# Patient Record
Sex: FEMALE | Race: BLACK OR AFRICAN AMERICAN | NOT HISPANIC OR LATINO | Employment: FULL TIME | URBAN - METROPOLITAN AREA
[De-identification: names, ages, dates, MRNs, and addresses within clinical notes are randomized per-mention and may not be internally consistent; named-entity substitution may affect disease eponyms.]

---

## 2019-08-12 ENCOUNTER — TRANSCRIBE ORDERS (OUTPATIENT)
Dept: ADMINISTRATIVE | Facility: HOSPITAL | Age: 34
End: 2019-08-12

## 2019-08-12 ENCOUNTER — HOSPITAL ENCOUNTER (OUTPATIENT)
Dept: RADIOLOGY | Facility: HOSPITAL | Age: 34
Discharge: HOME | End: 2019-08-12
Attending: INTERNAL MEDICINE
Payer: COMMERCIAL

## 2019-08-12 ENCOUNTER — APPOINTMENT (OUTPATIENT)
Dept: LAB | Facility: HOSPITAL | Age: 34
End: 2019-08-12
Attending: INTERNAL MEDICINE
Payer: COMMERCIAL

## 2019-08-12 DIAGNOSIS — M25.50 PAIN IN JOINT, MULTIPLE SITES: Primary | ICD-10-CM

## 2019-08-12 DIAGNOSIS — M25.50 PAIN IN JOINT, MULTIPLE SITES: ICD-10-CM

## 2019-08-12 LAB
BASOPHILS # BLD AUTO: 0.05 THOUSANDS/ΜL (ref 0–0.1)
BASOPHILS NFR BLD AUTO: 1 % (ref 0–1)
EOSINOPHIL # BLD AUTO: 0.1 THOUSAND/ΜL (ref 0–0.61)
EOSINOPHIL NFR BLD AUTO: 2 % (ref 0–6)
ERYTHROCYTE [DISTWIDTH] IN BLOOD BY AUTOMATED COUNT: 12.2 % (ref 11.6–15.1)
ERYTHROCYTE [SEDIMENTATION RATE] IN BLOOD: 4 MM/HOUR (ref 0–20)
HCT VFR BLD AUTO: 41.5 % (ref 34.8–46.1)
HGB BLD-MCNC: 13.6 G/DL (ref 11.5–15.4)
IMM GRANULOCYTES # BLD AUTO: 0.01 THOUSAND/UL (ref 0–0.2)
IMM GRANULOCYTES NFR BLD AUTO: 0 % (ref 0–2)
LYMPHOCYTES # BLD AUTO: 2.13 THOUSANDS/ΜL (ref 0.6–4.47)
LYMPHOCYTES NFR BLD AUTO: 37 % (ref 14–44)
MCH RBC QN AUTO: 29.8 PG (ref 26.8–34.3)
MCHC RBC AUTO-ENTMCNC: 32.8 G/DL (ref 31.4–37.4)
MCV RBC AUTO: 91 FL (ref 82–98)
MONOCYTES # BLD AUTO: 0.33 THOUSAND/ΜL (ref 0.17–1.22)
MONOCYTES NFR BLD AUTO: 6 % (ref 4–12)
NEUTROPHILS # BLD AUTO: 3.09 THOUSANDS/ΜL (ref 1.85–7.62)
NEUTS SEG NFR BLD AUTO: 54 % (ref 43–75)
NRBC BLD AUTO-RTO: 0 /100 WBCS
PLATELET # BLD AUTO: 283 THOUSANDS/UL (ref 149–390)
PMV BLD AUTO: 10.8 FL (ref 8.9–12.7)
RBC # BLD AUTO: 4.57 MILLION/UL (ref 3.81–5.12)
WBC # BLD AUTO: 5.71 THOUSAND/UL (ref 4.31–10.16)

## 2019-08-12 PROCEDURE — 73630 X-RAY EXAM OF FOOT: CPT

## 2019-08-12 PROCEDURE — 86200 CCP ANTIBODY: CPT

## 2019-08-12 PROCEDURE — 86618 LYME DISEASE ANTIBODY: CPT

## 2019-08-12 PROCEDURE — 36415 COLL VENOUS BLD VENIPUNCTURE: CPT

## 2019-08-12 PROCEDURE — 86430 RHEUMATOID FACTOR TEST QUAL: CPT

## 2019-08-12 PROCEDURE — 73110 X-RAY EXAM OF WRIST: CPT

## 2019-08-12 PROCEDURE — 86225 DNA ANTIBODY NATIVE: CPT

## 2019-08-12 PROCEDURE — 85025 COMPLETE CBC W/AUTO DIFF WBC: CPT

## 2019-08-12 PROCEDURE — 73522 X-RAY EXAM HIPS BI 3-4 VIEWS: CPT

## 2019-08-12 PROCEDURE — 86038 ANTINUCLEAR ANTIBODIES: CPT

## 2019-08-12 PROCEDURE — 85652 RBC SED RATE AUTOMATED: CPT

## 2019-08-13 LAB
DSDNA AB SER-ACNC: <1 IU/ML (ref 0–9)
RHEUMATOID FACT SER QL LA: NEGATIVE

## 2019-08-14 LAB
B BURGDOR IGG SER IA-ACNC: 0.08
B BURGDOR IGM SER IA-ACNC: 0.34
CCP IGA+IGG SERPL IA-ACNC: 15 UNITS (ref 0–19)
RYE IGE QN: NEGATIVE

## 2019-08-16 ENCOUNTER — TRANSCRIBE ORDERS (OUTPATIENT)
Dept: ADMINISTRATIVE | Facility: HOSPITAL | Age: 34
End: 2019-08-16

## 2019-08-16 ENCOUNTER — HOSPITAL ENCOUNTER (OUTPATIENT)
Dept: RADIOLOGY | Facility: HOSPITAL | Age: 34
Discharge: HOME | End: 2019-08-16
Payer: OTHER MISCELLANEOUS

## 2019-08-16 DIAGNOSIS — S99.921A INJURY OF TOE ON RIGHT FOOT, INITIAL ENCOUNTER: ICD-10-CM

## 2019-08-16 DIAGNOSIS — M79.671 RIGHT FOOT PAIN: ICD-10-CM

## 2019-08-16 DIAGNOSIS — M79.671 RIGHT FOOT PAIN: Primary | ICD-10-CM

## 2019-08-16 PROCEDURE — 73590 X-RAY EXAM OF LOWER LEG: CPT

## 2019-08-16 PROCEDURE — 73610 X-RAY EXAM OF ANKLE: CPT

## 2019-08-16 PROCEDURE — 73630 X-RAY EXAM OF FOOT: CPT

## 2019-08-27 ENCOUNTER — TRANSCRIBE ORDERS (OUTPATIENT)
Dept: ADMINISTRATIVE | Facility: HOSPITAL | Age: 34
End: 2019-08-27

## 2019-08-27 ENCOUNTER — HOSPITAL ENCOUNTER (OUTPATIENT)
Dept: ULTRASOUND IMAGING | Facility: HOSPITAL | Age: 34
Discharge: HOME | End: 2019-08-27
Payer: COMMERCIAL

## 2019-08-27 DIAGNOSIS — M79.661 TENDERNESS OF RIGHT CALF: ICD-10-CM

## 2019-08-27 DIAGNOSIS — M79.661 TENDERNESS OF RIGHT CALF: Primary | ICD-10-CM

## 2019-08-27 PROCEDURE — 93971 EXTREMITY STUDY: CPT

## 2019-08-27 PROCEDURE — 93971 EXTREMITY STUDY: CPT | Performed by: SURGERY

## 2019-09-12 ENCOUNTER — TRANSCRIBE ORDERS (OUTPATIENT)
Dept: ADMINISTRATIVE | Facility: HOSPITAL | Age: 34
End: 2019-09-12

## 2019-09-12 DIAGNOSIS — Z00.00 ROUTINE GENERAL MEDICAL EXAMINATION AT A HEALTH CARE FACILITY: ICD-10-CM

## 2019-09-12 DIAGNOSIS — R42 DIZZINESS AND GIDDINESS: ICD-10-CM

## 2019-09-12 DIAGNOSIS — E55.9 VITAMIN D DEFICIENCY: ICD-10-CM

## 2019-09-12 DIAGNOSIS — R16.1 SPLENOMEGALY: Primary | ICD-10-CM

## 2019-09-16 ENCOUNTER — HOSPITAL ENCOUNTER (OUTPATIENT)
Dept: ULTRASOUND IMAGING | Facility: HOSPITAL | Age: 34
Discharge: HOME | End: 2019-09-16
Attending: INTERNAL MEDICINE
Payer: COMMERCIAL

## 2019-09-16 ENCOUNTER — HOSPITAL ENCOUNTER (OUTPATIENT)
Dept: NON INVASIVE DIAGNOSTICS | Facility: HOSPITAL | Age: 34
Discharge: HOME | End: 2019-09-16
Payer: COMMERCIAL

## 2019-09-16 ENCOUNTER — APPOINTMENT (OUTPATIENT)
Dept: LAB | Facility: HOSPITAL | Age: 34
End: 2019-09-16
Payer: COMMERCIAL

## 2019-09-16 DIAGNOSIS — R42 DIZZINESS AND GIDDINESS: ICD-10-CM

## 2019-09-16 DIAGNOSIS — Z00.00 ROUTINE GENERAL MEDICAL EXAMINATION AT A HEALTH CARE FACILITY: ICD-10-CM

## 2019-09-16 DIAGNOSIS — R16.1 SPLENOMEGALY: ICD-10-CM

## 2019-09-16 DIAGNOSIS — E55.9 VITAMIN D DEFICIENCY: ICD-10-CM

## 2019-09-16 LAB
25(OH)D3 SERPL-MCNC: 21.6 NG/ML (ref 30–100)
ATRIAL RATE: 77 BPM
BASOPHILS # BLD AUTO: 0.04 THOUSANDS/ΜL (ref 0–0.1)
BASOPHILS NFR BLD AUTO: 1 % (ref 0–1)
CHOLEST SERPL-MCNC: 166 MG/DL (ref 50–200)
EOSINOPHIL # BLD AUTO: 0.11 THOUSAND/ΜL (ref 0–0.61)
EOSINOPHIL NFR BLD AUTO: 2 % (ref 0–6)
ERYTHROCYTE [DISTWIDTH] IN BLOOD BY AUTOMATED COUNT: 12 % (ref 11.6–15.1)
EST. AVERAGE GLUCOSE BLD GHB EST-MCNC: 94 MG/DL
HBA1C MFR BLD: 4.9 % (ref 4.2–6.3)
HCT VFR BLD AUTO: 40.4 % (ref 34.8–46.1)
HDLC SERPL-MCNC: 47 MG/DL (ref 40–60)
HGB BLD-MCNC: 12.9 G/DL (ref 11.5–15.4)
IMM GRANULOCYTES # BLD AUTO: 0.01 THOUSAND/UL (ref 0–0.2)
IMM GRANULOCYTES NFR BLD AUTO: 0 % (ref 0–2)
LDLC SERPL CALC-MCNC: 110 MG/DL (ref 0–100)
LYMPHOCYTES # BLD AUTO: 2.18 THOUSANDS/ΜL (ref 0.6–4.47)
LYMPHOCYTES NFR BLD AUTO: 36 % (ref 14–44)
MCH RBC QN AUTO: 29.5 PG (ref 26.8–34.3)
MCHC RBC AUTO-ENTMCNC: 31.9 G/DL (ref 31.4–37.4)
MCV RBC AUTO: 92 FL (ref 82–98)
MONOCYTES # BLD AUTO: 0.32 THOUSAND/ΜL (ref 0.17–1.22)
MONOCYTES NFR BLD AUTO: 5 % (ref 4–12)
NEUTROPHILS # BLD AUTO: 3.48 THOUSANDS/ΜL (ref 1.85–7.62)
NEUTS SEG NFR BLD AUTO: 56 % (ref 43–75)
NONHDLC SERPL-MCNC: 119 MG/DL
NRBC BLD AUTO-RTO: 0 /100 WBCS
P AXIS: 74 DEGREES
PLATELET # BLD AUTO: 285 THOUSANDS/UL (ref 149–390)
PMV BLD AUTO: 10.8 FL (ref 8.9–12.7)
PR INTERVAL: 146 MS
QRS AXIS: 65 DEGREES
QRSD INTERVAL: 72 MS
QT INTERVAL: 358 MS
QTC INTERVAL: 405 MS
RBC # BLD AUTO: 4.37 MILLION/UL (ref 3.81–5.12)
T WAVE AXIS: 47 DEGREES
TRIGL SERPL-MCNC: 44 MG/DL
VENTRICULAR RATE: 77 BPM
WBC # BLD AUTO: 6.14 THOUSAND/UL (ref 4.31–10.16)

## 2019-09-16 PROCEDURE — 93010 ELECTROCARDIOGRAM REPORT: CPT | Performed by: INTERNAL MEDICINE

## 2019-09-16 PROCEDURE — 93005 ELECTROCARDIOGRAM TRACING: CPT

## 2019-09-16 PROCEDURE — 85025 COMPLETE CBC W/AUTO DIFF WBC: CPT

## 2019-09-16 PROCEDURE — 80061 LIPID PANEL: CPT

## 2019-09-16 PROCEDURE — 36415 COLL VENOUS BLD VENIPUNCTURE: CPT

## 2019-09-16 PROCEDURE — 76700 US EXAM ABDOM COMPLETE: CPT

## 2019-09-16 PROCEDURE — 83036 HEMOGLOBIN GLYCOSYLATED A1C: CPT

## 2019-09-16 PROCEDURE — 82306 VITAMIN D 25 HYDROXY: CPT

## 2021-02-11 ENCOUNTER — TRANSCRIBE ORDERS (OUTPATIENT)
Dept: ADMINISTRATIVE | Facility: HOSPITAL | Age: 36
End: 2021-02-11

## 2021-02-11 DIAGNOSIS — R09.89 OTHER SPECIFIED SYMPTOMS AND SIGNS INVOLVING THE CIRCULATORY AND RESPIRATORY SYSTEMS: Primary | ICD-10-CM

## 2021-02-13 ENCOUNTER — LAB (OUTPATIENT)
Dept: LAB | Facility: HOSPITAL | Age: 36
End: 2021-02-13
Attending: INTERNAL MEDICINE
Payer: COMMERCIAL

## 2021-02-13 ENCOUNTER — TRANSCRIBE ORDERS (OUTPATIENT)
Dept: ADMINISTRATIVE | Facility: HOSPITAL | Age: 36
End: 2021-02-13

## 2021-02-13 DIAGNOSIS — Z11.59 SCREENING EXAMINATION FOR POLIOMYELITIS: ICD-10-CM

## 2021-02-13 DIAGNOSIS — E55.9 VITAMIN D DEFICIENCY: ICD-10-CM

## 2021-02-13 DIAGNOSIS — R00.2 PALPITATIONS: ICD-10-CM

## 2021-02-13 DIAGNOSIS — E03.9 ACQUIRED HYPOTHYROIDISM: ICD-10-CM

## 2021-02-13 DIAGNOSIS — D64.9 ANEMIA, UNSPECIFIED TYPE: ICD-10-CM

## 2021-02-13 DIAGNOSIS — Z00.00 ROUTINE GENERAL MEDICAL EXAMINATION AT A HEALTH CARE FACILITY: ICD-10-CM

## 2021-02-13 DIAGNOSIS — E78.2 MIXED HYPERLIPIDEMIA: ICD-10-CM

## 2021-02-13 DIAGNOSIS — R09.89 ABNORMAL CHEST SOUNDS: ICD-10-CM

## 2021-02-13 DIAGNOSIS — R10.9 STOMACH ACHE: Primary | ICD-10-CM

## 2021-02-13 LAB
25(OH)D3 SERPL-MCNC: 29 NG/ML (ref 30–100)
ABO GROUP BLD: NORMAL
ALBUMIN SERPL BCP-MCNC: 3.8 G/DL (ref 3.5–5)
ALP SERPL-CCNC: 69 U/L (ref 46–116)
ALT SERPL W P-5'-P-CCNC: 21 U/L (ref 12–78)
ANION GAP SERPL CALCULATED.3IONS-SCNC: 8 MMOL/L (ref 4–13)
AST SERPL W P-5'-P-CCNC: 19 U/L (ref 5–45)
BASOPHILS # BLD AUTO: 0.04 THOUSANDS/ΜL (ref 0–0.1)
BASOPHILS NFR BLD AUTO: 1 % (ref 0–1)
BILIRUB SERPL-MCNC: 0.5 MG/DL (ref 0.2–1)
BLD GP AB SCN SERPL QL: NEGATIVE
BUN SERPL-MCNC: 16 MG/DL (ref 5–25)
CALCIUM SERPL-MCNC: 9.3 MG/DL (ref 8.3–10.1)
CHLORIDE SERPL-SCNC: 106 MMOL/L (ref 100–108)
CHOLEST SERPL-MCNC: 169 MG/DL (ref 50–200)
CO2 SERPL-SCNC: 28 MMOL/L (ref 21–32)
CREAT SERPL-MCNC: 0.92 MG/DL (ref 0.6–1.3)
EOSINOPHIL # BLD AUTO: 0.06 THOUSAND/ΜL (ref 0–0.61)
EOSINOPHIL NFR BLD AUTO: 1 % (ref 0–6)
ERYTHROCYTE [DISTWIDTH] IN BLOOD BY AUTOMATED COUNT: 12.5 % (ref 11.6–15.1)
EST. AVERAGE GLUCOSE BLD GHB EST-MCNC: 94 MG/DL
GFR SERPL CREATININE-BSD FRML MDRD: 93 ML/MIN/1.73SQ M
GLUCOSE P FAST SERPL-MCNC: 91 MG/DL (ref 65–99)
HBA1C MFR BLD: 4.9 %
HCT VFR BLD AUTO: 40.8 % (ref 34.8–46.1)
HDLC SERPL-MCNC: 64 MG/DL
HGB BLD-MCNC: 12.9 G/DL (ref 11.5–15.4)
IMM GRANULOCYTES # BLD AUTO: 0.01 THOUSAND/UL (ref 0–0.2)
IMM GRANULOCYTES NFR BLD AUTO: 0 % (ref 0–2)
LDLC SERPL CALC-MCNC: 98 MG/DL (ref 0–100)
LYMPHOCYTES # BLD AUTO: 2.26 THOUSANDS/ΜL (ref 0.6–4.47)
LYMPHOCYTES NFR BLD AUTO: 51 % (ref 14–44)
MCH RBC QN AUTO: 29.1 PG (ref 26.8–34.3)
MCHC RBC AUTO-ENTMCNC: 31.6 G/DL (ref 31.4–37.4)
MCV RBC AUTO: 92 FL (ref 82–98)
MONOCYTES # BLD AUTO: 0.29 THOUSAND/ΜL (ref 0.17–1.22)
MONOCYTES NFR BLD AUTO: 6 % (ref 4–12)
NEUTROPHILS # BLD AUTO: 1.86 THOUSANDS/ΜL (ref 1.85–7.62)
NEUTS SEG NFR BLD AUTO: 41 % (ref 43–75)
NONHDLC SERPL-MCNC: 105 MG/DL
NRBC BLD AUTO-RTO: 0 /100 WBCS
PLATELET # BLD AUTO: 259 THOUSANDS/UL (ref 149–390)
PMV BLD AUTO: 10.4 FL (ref 8.9–12.7)
POTASSIUM SERPL-SCNC: 4.9 MMOL/L (ref 3.5–5.3)
PROT SERPL-MCNC: 7.8 G/DL (ref 6.4–8.2)
RBC # BLD AUTO: 4.44 MILLION/UL (ref 3.81–5.12)
RH BLD: POSITIVE
SODIUM SERPL-SCNC: 142 MMOL/L (ref 136–145)
SPECIMEN EXPIRATION DATE: NORMAL
TRIGL SERPL-MCNC: 34 MG/DL
TSH SERPL DL<=0.05 MIU/L-ACNC: 0.61 UIU/ML (ref 0.36–3.74)
WBC # BLD AUTO: 4.52 THOUSAND/UL (ref 4.31–10.16)

## 2021-02-13 PROCEDURE — 85025 COMPLETE CBC W/AUTO DIFF WBC: CPT

## 2021-02-13 PROCEDURE — 83036 HEMOGLOBIN GLYCOSYLATED A1C: CPT

## 2021-02-13 PROCEDURE — 36415 COLL VENOUS BLD VENIPUNCTURE: CPT

## 2021-02-13 PROCEDURE — 86850 RBC ANTIBODY SCREEN: CPT

## 2021-02-13 PROCEDURE — 86803 HEPATITIS C AB TEST: CPT

## 2021-02-13 PROCEDURE — 80061 LIPID PANEL: CPT

## 2021-02-13 PROCEDURE — 86901 BLOOD TYPING SEROLOGIC RH(D): CPT

## 2021-02-13 PROCEDURE — 84443 ASSAY THYROID STIM HORMONE: CPT

## 2021-02-13 PROCEDURE — 86900 BLOOD TYPING SEROLOGIC ABO: CPT

## 2021-02-13 PROCEDURE — 80053 COMPREHEN METABOLIC PANEL: CPT

## 2021-02-13 PROCEDURE — 82306 VITAMIN D 25 HYDROXY: CPT

## 2021-02-14 LAB — HCV AB SER QL: NORMAL

## 2021-02-21 ENCOUNTER — HOSPITAL ENCOUNTER (OUTPATIENT)
Dept: ULTRASOUND IMAGING | Facility: HOSPITAL | Age: 36
Discharge: HOME | End: 2021-02-21
Attending: INTERNAL MEDICINE

## 2021-02-21 DIAGNOSIS — R09.89 OTHER SPECIFIED SYMPTOMS AND SIGNS INVOLVING THE CIRCULATORY AND RESPIRATORY SYSTEMS: ICD-10-CM

## 2021-03-17 ENCOUNTER — HOSPITAL ENCOUNTER (OUTPATIENT)
Dept: NON INVASIVE DIAGNOSTICS | Facility: HOSPITAL | Age: 36
Discharge: HOME | End: 2021-03-17
Attending: INTERNAL MEDICINE
Payer: COMMERCIAL

## 2021-03-17 ENCOUNTER — HOSPITAL ENCOUNTER (OUTPATIENT)
Dept: VASCULAR ULTRASOUND | Facility: HOSPITAL | Age: 36
Discharge: HOME | End: 2021-03-17
Payer: COMMERCIAL

## 2021-03-17 ENCOUNTER — HOSPITAL ENCOUNTER (OUTPATIENT)
Dept: ULTRASOUND IMAGING | Facility: HOSPITAL | Age: 36
Discharge: HOME | End: 2021-03-17
Attending: INTERNAL MEDICINE
Payer: COMMERCIAL

## 2021-03-17 DIAGNOSIS — R00.2 PALPITATIONS: ICD-10-CM

## 2021-03-17 DIAGNOSIS — R09.89 ABNORMAL CHEST SOUNDS: ICD-10-CM

## 2021-03-17 DIAGNOSIS — R10.9 STOMACH ACHE: ICD-10-CM

## 2021-03-17 LAB
ARRHY DURING EX: NORMAL
CHEST PAIN STATEMENT: NORMAL
MAX DIASTOLIC BP: 70 MMHG
MAX HEART RATE: 169 BPM
MAX PREDICTED HEART RATE: 184 BPM
MAX. SYSTOLIC BP: 148 MMHG
PROTOCOL NAME: NORMAL
REASON FOR TERMINATION: NORMAL
TARGET HR FORMULA: NORMAL
TEST INDICATION: NORMAL
TIME IN EXERCISE PHASE: NORMAL

## 2021-03-17 PROCEDURE — 93978 VASCULAR STUDY: CPT | Performed by: SURGERY

## 2021-03-17 PROCEDURE — 93018 CV STRESS TEST I&R ONLY: CPT | Performed by: INTERNAL MEDICINE

## 2021-03-17 PROCEDURE — 76700 US EXAM ABDOM COMPLETE: CPT

## 2021-03-17 PROCEDURE — 93978 VASCULAR STUDY: CPT

## 2021-03-17 PROCEDURE — 93016 CV STRESS TEST SUPVJ ONLY: CPT | Performed by: INTERNAL MEDICINE

## 2021-03-17 PROCEDURE — 93017 CV STRESS TEST TRACING ONLY: CPT

## 2021-05-24 ENCOUNTER — TELEPHONE (OUTPATIENT)
Dept: GASTROENTEROLOGY | Facility: AMBULARY SURGERY CENTER | Age: 36
End: 2021-05-24

## 2021-05-24 NOTE — TELEPHONE ENCOUNTER
Reschedule letter mailed to patient making them aware of new office appointment  Per office protocol, patient may be asked to see one of our Advanced Practitioners for next visit  Scheduling number was provided on the reschedule letter should patient have any concerns

## 2021-06-14 ENCOUNTER — EVALUATION (OUTPATIENT)
Dept: PHYSICAL THERAPY | Facility: CLINIC | Age: 36
End: 2021-06-14
Payer: COMMERCIAL

## 2021-06-14 DIAGNOSIS — M54.12 CERVICAL RADICULOPATHY: Primary | ICD-10-CM

## 2021-06-14 DIAGNOSIS — M79.602 LEFT ARM PAIN: ICD-10-CM

## 2021-06-14 PROCEDURE — 97162 PT EVAL MOD COMPLEX 30 MIN: CPT | Performed by: PHYSICAL THERAPIST

## 2021-06-14 PROCEDURE — 97112 NEUROMUSCULAR REEDUCATION: CPT | Performed by: PHYSICAL THERAPIST

## 2021-06-14 PROCEDURE — 97140 MANUAL THERAPY 1/> REGIONS: CPT | Performed by: PHYSICAL THERAPIST

## 2021-06-14 NOTE — PROGRESS NOTES
PT Evaluation     Today's date: 2021  Patient name: Julien Casiano  : 1985  MRN: [id number]  Referring provider: Suhas Domínguez MD  Dx: No diagnosis found  Assessment  Assessment details: Julien Casiano is a 38 y o  female who presents with pain, decreased strength, decreased ROM, decreased joint mobility and postural  dysfunction  Due to these impairments, Patient has difficulty performing a/iadls, recreational activities, work-related activities and engaging in social activities  Patient has signs and symptoms consistent with their referring diagnosis of cervical radiculopathy, left  Left arm pain  Patient would benefit from skilled physical therapy to address the impairments, improve their level of function and to improve their overall quality of life  Impairments: abnormal muscle tone, abnormal or restricted ROM, abnormal movement, activity intolerance, impaired physical strength, lacks appropriate home exercise program, poor posture  and poor body mechanics    Symptom irritability: moderateUnderstanding of Dx/Px/POC: excellent  Goals  Short Term Goals: to be achieved by 4 weeks  1) Patient to be independent with basic HEP  2) Decrease pain to 3/10 at its worst   3) Increase cervical spine ROM by 5-10 degrees in all deficient planes  4) Increase UE strength by 1/2 MMT grade in all deficient planes  5) Improve joint mobility in cervical spine to normal     Long Term Goals: to be achieved by discharge  1) FOTO equal to or greater than 75   2) Patient to be independent with comprehensive HEP  3) Cervical spine ROM WNL all planes to improve a/iadls  4) Increase UE strength to 1 MMT grade in all planes to improve a/iadls    5) Lifting is improved to maximal level of function       Plan  Patient would benefit from: PT eval and skilled physical therapy  Planned modality interventions: low level laser therapy, TENS, thermotherapy: hydrocollator packs, ultrasound and cryotherapy  Planned therapy interventions: manual therapy, activity modification, behavior modification, neuromuscular re-education, therapeutic activities, therapeutic exercise, gait training, graded activity, graded exercise, patient education, home exercise program and sensory integrative techniques  Frequency: 2x week  Duration in weeks: 6  Treatment plan discussed with: patient        Subjective Evaluation    History of Present Illness  Mechanism of injury: History of Current Injury: Patient believes that she has a pinched nerve which she has had for years, started to get worse about 1-2 months ago, she is having numbness in her thumb and index finger, shooting pain down the left arm arm  Pain location/Descriptors: neck pain is described as a pinch, like something is cramped  Aggravating factors: laying on the right side, lifting the left arm  She states that she will be doing her normal activities and out of no where she will have pain  Easing factors: warm bath, massage to the neck  24 HR pattern: NA  Imaging: NA  Special Questions: Patient reports numbness and tingling in the left arm but denies burning  Patient denies headaches  Patient denies night pain and night sweats  Patient denies sustained morning stiffness  Patient denies lightheadedness  Patient denies constipation  Patient denies easy bruising  Patient denies changes in vision  Patient goals:  Eliminate pain  Occupation:  at the [employer]               Objective     Passive Range of Motion   Cervical/Thoracic Spine   Normal passive range of motion    Joint Play     Hypermobile: C4, C5, C6, C7 and T1     Pain: C4, C5, C6, C7 and T1   Mechanical Assessment    Cervical    Seated Protrusion: repeated movements   Pain location: no change  Seated retraction: repeated movements   Pain intensity: better    Thoracic      Lumbar      Tests   Cervical   Positive repeated flexion, vertical compression and lumbar distraction test     Left Positive Spurling's Test A and Spurling's Test B  Left Shoulder   Positive ULTT2  Lumbar   Positive vertical compression                 Precautions: NA      Manuals 6/14                         Manual traction EG                                      Neuro Re-Ed                          Median nerve glide x30                                                                             Ther Ex                                                                                                                     Ther Activity                                       Gait Training                                       Modalities

## 2021-06-14 NOTE — LETTER
Anju 15, 2021    [facility]  [address] [facility] [place]  [address]    Patient: Quin Reed   YOB: 1985   Date of Visit: 2021     Encounter Diagnosis     ICD-10-CM    1  Cervical radiculopathy  M54 12    2  Left arm pain  M79 602        Dear Dr J Luis Vides:    Thank you for your recent referral of Kari Woods  Please review the attached evaluation summary from Kari's recent visit  Please verify that you agree with the plan of care by signing the attached order  If you have any questions or concerns, please do not hesitate to call  I sincerely appreciate the opportunity to share in the care of one of your patients and hope to have another opportunity to work with you in the near future  Sincerely,    Kamila Dawson, PT      Referring Provider:      I certify that I have read the below Plan of Care and certify the need for these services furnished under this plan of treatment while under my care  [facility]  [address] [place]  [address] [address]  Via Fax: [phone]          PT Evaluation     Today's date: 2021  Patient name: Quin Reed  : 1985  MRN: [id number]  Referring provider: Kin Moseley MD  Dx: No diagnosis found  Assessment  Assessment details: Quin Reed is a 38 y o  female who presents with pain, decreased strength, decreased ROM, decreased joint mobility and postural  dysfunction  Due to these impairments, Patient has difficulty performing a/iadls, recreational activities, work-related activities and engaging in social activities  Patient has signs and symptoms consistent with their referring diagnosis of cervical radiculopathy, left  Left arm pain  Patient would benefit from skilled physical therapy to address the impairments, improve their level of function and to improve their overall quality of life      Impairments: abnormal muscle tone, abnormal or restricted ROM, abnormal movement, activity intolerance, impaired physical strength, lacks appropriate home exercise program, poor posture  and poor body mechanics    Symptom irritability: moderateUnderstanding of Dx/Px/POC: excellent  Goals  Short Term Goals: to be achieved by 4 weeks  1) Patient to be independent with basic HEP  2) Decrease pain to 3/10 at its worst   3) Increase cervical spine ROM by 5-10 degrees in all deficient planes  4) Increase UE strength by 1/2 MMT grade in all deficient planes  5) Improve joint mobility in cervical spine to normal     Long Term Goals: to be achieved by discharge  1) FOTO equal to or greater than 75   2) Patient to be independent with comprehensive HEP  3) Cervical spine ROM WNL all planes to improve a/iadls  4) Increase UE strength to 1 MMT grade in all planes to improve a/iadls  5) Lifting is improved to maximal level of function       Plan  Patient would benefit from: PT eval and skilled physical therapy  Planned modality interventions: low level laser therapy, TENS, thermotherapy: hydrocollator packs, ultrasound and cryotherapy  Planned therapy interventions: manual therapy, activity modification, behavior modification, neuromuscular re-education, therapeutic activities, therapeutic exercise, gait training, graded activity, graded exercise, patient education, home exercise program and sensory integrative techniques  Frequency: 2x week  Duration in weeks: 6  Treatment plan discussed with: patient        Subjective Evaluation    History of Present Illness  Mechanism of injury: History of Current Injury: Patient believes that she has a pinched nerve which she has had for years, started to get worse about 1-2 months ago, she is having numbness in her thumb and index finger, shooting pain down the left arm arm  Pain location/Descriptors: neck pain is described as a pinch, like something is cramped  Aggravating factors: laying on the right side, lifting the left arm  She states that she will be doing her normal activities and out of no where she will have pain  Easing factors: warm bath, massage to the neck  24 HR pattern: NA  Imaging: NA  Special Questions: Patient reports numbness and tingling in the left arm but denies burning  Patient denies headaches  Patient denies night pain and night sweats  Patient denies sustained morning stiffness  Patient denies lightheadedness  Patient denies constipation  Patient denies easy bruising  Patient denies changes in vision  Patient goals:  Eliminate pain  Occupation:  at the [employer]  Objective     Passive Range of Motion   Cervical/Thoracic Spine   Normal passive range of motion    Joint Play     Hypermobile: C4, C5, C6, C7 and T1     Pain: C4, C5, C6, C7 and T1   Mechanical Assessment    Cervical    Seated Protrusion: repeated movements   Pain location: no change  Seated retraction: repeated movements   Pain intensity: better    Thoracic      Lumbar      Tests   Cervical   Positive repeated flexion, vertical compression and lumbar distraction test     Left   Positive Spurling's Test A and Spurling's Test B  Left Shoulder   Positive ULTT2  Lumbar   Positive vertical compression                 Precautions: NA      Manuals 6/14                         Manual traction EG                                      Neuro Re-Ed                          Median nerve glide x30                                                                             Ther Ex                                                                                                                     Ther Activity                                       Gait Training                                       Modalities

## 2021-06-15 ENCOUNTER — TRANSCRIBE ORDERS (OUTPATIENT)
Dept: PHYSICAL THERAPY | Facility: CLINIC | Age: 36
End: 2021-06-15

## 2021-06-15 DIAGNOSIS — M54.12 CERVICAL RADICULOPATHY: Primary | ICD-10-CM

## 2021-06-16 ENCOUNTER — OFFICE VISIT (OUTPATIENT)
Dept: PHYSICAL THERAPY | Facility: CLINIC | Age: 36
End: 2021-06-16
Payer: COMMERCIAL

## 2021-06-16 DIAGNOSIS — M54.12 CERVICAL RADICULOPATHY: Primary | ICD-10-CM

## 2021-06-16 DIAGNOSIS — M79.602 LEFT ARM PAIN: ICD-10-CM

## 2021-06-16 PROCEDURE — 97140 MANUAL THERAPY 1/> REGIONS: CPT

## 2021-06-16 PROCEDURE — 97112 NEUROMUSCULAR REEDUCATION: CPT

## 2021-06-16 NOTE — PROGRESS NOTES
Daily Note     Today's date: 2021  Patient name: Julien Casiano  : 1985  MRN: [id number]  Referring provider: Suhas Domínguez MD  Dx:   Encounter Diagnosis     ICD-10-CM    1  Cervical radiculopathy  M54 12    2  Left arm pain  M79 602                   Subjective: Pt reports increased radicular symptoms after performing no more dishes exercise at home and was wondering if that was working  Objective: See treatment diary below      Assessment: Initiated pt POC this visit with good tolerance  Emphasis t/o visit to ensure correct scapular positioning and posture  No increases in the intensity of radicular symptoms when posture was corrected with no more dishes exercise  Pt educated how radicular symptoms should centralize to neck and not intensify or increase further down the arm when performing nerve glides  Pt educated on the Progress as able  Plan: Continue with current POC to address pt deficits        Precautions: NA      Manuals                         Manual traction EG TB                                     Neuro Re-Ed                          Median nerve glide x30 x30           No more dishes   x30           scap squeeze   10"x10           Posterior shoulder rolls   20x           Chin tucks   3"x20                        Ther Ex             Thoracic extension over half foam roll  10"x10           Thoracic rotation   1-2"x10 ea b/l           Pt ed   5'                                                                            Ther Activity                                       Gait Training                                       Modalities

## 2021-06-21 ENCOUNTER — OFFICE VISIT (OUTPATIENT)
Dept: PHYSICAL THERAPY | Facility: CLINIC | Age: 36
End: 2021-06-21
Payer: COMMERCIAL

## 2021-06-21 DIAGNOSIS — M79.602 LEFT ARM PAIN: ICD-10-CM

## 2021-06-21 DIAGNOSIS — M54.12 CERVICAL RADICULOPATHY: Primary | ICD-10-CM

## 2021-06-21 PROCEDURE — 97112 NEUROMUSCULAR REEDUCATION: CPT

## 2021-06-21 PROCEDURE — 97140 MANUAL THERAPY 1/> REGIONS: CPT

## 2021-06-21 NOTE — PROGRESS NOTES
Daily Note     Today's date: 2021  Patient name: Garrick Alvarez  : 1985  MRN: [id number]  Referring provider: Rosa Isela Cortez MD  Dx:   Encounter Diagnosis     ICD-10-CM    1  Cervical radiculopathy  M54 12    2  Left arm pain  M79 602                   Subjective: Pt reports being compliant with HEP  She continues to have radicular symptoms into L first and second digit  Objective: See treatment diary below      Assessment: Added UT/levator scap stretches to improve c/s flexibility with good tolerance, visual cues to ensure correct exercise technique  Pt reports less intensity of radicular symptoms with manual traction  Plan: Continue with current POC to address pt deficits        Precautions: NA      Manuals                        Manual traction EG TB TB          Manual median n  glides   TB                       Neuro Re-Ed                          Median nerve glide x30 x30 x30          No more dishes   x30 x30          scap squeeze   10"x10 10"x10          Posterior shoulder rolls   20x 20x          Chin tucks   3"x20 20x                       Ther Ex             Thoracic extension over half foam roll  10"x10 10"10          Thoracic rotation   1-2"x10 ea b/l 1-2"x10 ea b/l           Pt ed   5'           Levator scap stretch    30"x3 ea b/l           UT stretch    30"x3                                                 Ther Activity                                       Gait Training                                       Modalities

## 2021-06-23 ENCOUNTER — OFFICE VISIT (OUTPATIENT)
Dept: PHYSICAL THERAPY | Facility: CLINIC | Age: 36
End: 2021-06-23
Payer: COMMERCIAL

## 2021-06-23 DIAGNOSIS — M79.602 LEFT ARM PAIN: ICD-10-CM

## 2021-06-23 DIAGNOSIS — M54.12 CERVICAL RADICULOPATHY: Primary | ICD-10-CM

## 2021-06-23 PROCEDURE — 97140 MANUAL THERAPY 1/> REGIONS: CPT

## 2021-06-23 PROCEDURE — 97112 NEUROMUSCULAR REEDUCATION: CPT

## 2021-06-23 NOTE — PROGRESS NOTES
Daily Note     Today's date: 2021  Patient name: Ninoska Kang  : 1985  MRN: [id number]  Referring provider: Go Landin MD  Dx:   Encounter Diagnosis     ICD-10-CM    1  Cervical radiculopathy  M54 12    2  Left arm pain  M79 602        Start Time: 1725  Stop Time: 1805  Total time in clinic (min): 40 minutes    Subjective: Patient reports pain in scapular region has improved but radic sxs in fingers have not changed  Objective: See treatment diary below      Assessment: Tolerated treatment well  Patient demonstrated fatigue post treatment  And would benefit from continued skilled PT  Patient felt relief w/ man txn  Initiated gentle strengthening w/ cueing for posture  Patient c/o some numbness in fingers from holding the band  Plan: Continue per plan of care        Precautions: NA      Manuals                       Manual traction EG TB TB JH         Manual median n  glides   TB JH                      Neuro Re-Ed                          Median nerve glide x30 x30 x30 30x         No more dishes   x30 x30 30x         scap squeeze   10"x10 10"x10          Posterior shoulder rolls   20x 20x 20x         Chin tucks   3"x20 20x 20x         Supine ER/HA    YTB 20x 5"         Ther Ex             Thoracic extension over half foam roll  10"x10 10"10 10" 10x         Thoracic rotation   1-2"x10 ea b/l 1-2"x10 ea b/l           Pt ed   5'           Levator scap stretch    30"x3 ea b/l  nv         UT stretch    30"x3 30" 3x                                                Ther Activity                                       Gait Training                                       Modalities

## 2021-06-26 ENCOUNTER — HOSPITAL ENCOUNTER (EMERGENCY)
Facility: HOSPITAL | Age: 36
Discharge: HOME | End: 2021-06-26
Attending: EMERGENCY MEDICINE | Admitting: EMERGENCY MEDICINE
Payer: COMMERCIAL

## 2021-06-26 VITALS
HEART RATE: 92 BPM | BODY MASS INDEX: 23.95 KG/M2 | DIASTOLIC BLOOD PRESSURE: 65 MMHG | RESPIRATION RATE: 17 BRPM | TEMPERATURE: 99.3 F | WEIGHT: 122 LBS | HEIGHT: 60 IN | OXYGEN SATURATION: 100 % | SYSTOLIC BLOOD PRESSURE: 120 MMHG

## 2021-06-26 DIAGNOSIS — N93.8 DYSFUNCTIONAL UTERINE BLEEDING: ICD-10-CM

## 2021-06-26 DIAGNOSIS — R10.9 ABDOMINAL PAIN, UNSPECIFIED ABDOMINAL LOCATION: Primary | ICD-10-CM

## 2021-06-26 DIAGNOSIS — E86.0 DEHYDRATION: ICD-10-CM

## 2021-06-26 LAB
ALBUMIN SERPL BCP-MCNC: 3.9 G/DL (ref 3.5–5)
ALP SERPL-CCNC: 72 U/L (ref 46–116)
ALT SERPL W P-5'-P-CCNC: 20 U/L (ref 12–78)
ANION GAP SERPL CALCULATED.3IONS-SCNC: 7 MMOL/L (ref 4–13)
AST SERPL W P-5'-P-CCNC: 19 U/L (ref 5–45)
BACTERIA UR QL AUTO: ABNORMAL /HPF
BASOPHILS # BLD AUTO: 0.05 THOUSANDS/ΜL (ref 0–0.1)
BASOPHILS NFR BLD AUTO: 0 % (ref 0–1)
BILIRUB SERPL-MCNC: 0.72 MG/DL (ref 0.2–1)
BILIRUB UR QL STRIP: NEGATIVE
BUN SERPL-MCNC: 16 MG/DL (ref 5–25)
CALCIUM SERPL-MCNC: 9.7 MG/DL (ref 8.3–10.1)
CHLORIDE SERPL-SCNC: 103 MMOL/L (ref 100–108)
CLARITY UR: ABNORMAL
CO2 SERPL-SCNC: 27 MMOL/L (ref 21–32)
COLOR UR: YELLOW
CREAT SERPL-MCNC: 1.01 MG/DL (ref 0.6–1.3)
EOSINOPHIL # BLD AUTO: 0.03 THOUSAND/ΜL (ref 0–0.61)
EOSINOPHIL NFR BLD AUTO: 0 % (ref 0–6)
ERYTHROCYTE [DISTWIDTH] IN BLOOD BY AUTOMATED COUNT: 12.8 % (ref 11.6–15.1)
EXT PREG TEST URINE: NEGATIVE
EXT. CONTROL ED NAV: NORMAL
GFR SERPL CREATININE-BSD FRML MDRD: 83 ML/MIN/1.73SQ M
GLUCOSE SERPL-MCNC: 89 MG/DL (ref 65–140)
GLUCOSE UR STRIP-MCNC: NEGATIVE MG/DL
HCT VFR BLD AUTO: 41.3 % (ref 34.8–46.1)
HGB BLD-MCNC: 13.3 G/DL (ref 11.5–15.4)
HGB UR QL STRIP.AUTO: ABNORMAL
IMM GRANULOCYTES # BLD AUTO: 0.05 THOUSAND/UL (ref 0–0.2)
IMM GRANULOCYTES NFR BLD AUTO: 0 % (ref 0–2)
KETONES UR STRIP-MCNC: ABNORMAL MG/DL
LEUKOCYTE ESTERASE UR QL STRIP: NEGATIVE
LIPASE SERPL-CCNC: 52 U/L (ref 73–393)
LYMPHOCYTES # BLD AUTO: 1.69 THOUSANDS/ΜL (ref 0.6–4.47)
LYMPHOCYTES NFR BLD AUTO: 13 % (ref 14–44)
MCH RBC QN AUTO: 29.6 PG (ref 26.8–34.3)
MCHC RBC AUTO-ENTMCNC: 32.2 G/DL (ref 31.4–37.4)
MCV RBC AUTO: 92 FL (ref 82–98)
MONOCYTES # BLD AUTO: 0.36 THOUSAND/ΜL (ref 0.17–1.22)
MONOCYTES NFR BLD AUTO: 3 % (ref 4–12)
NEUTROPHILS # BLD AUTO: 11.09 THOUSANDS/ΜL (ref 1.85–7.62)
NEUTS SEG NFR BLD AUTO: 84 % (ref 43–75)
NITRITE UR QL STRIP: NEGATIVE
NON-SQ EPI CELLS URNS QL MICRO: ABNORMAL /HPF
NRBC BLD AUTO-RTO: 0 /100 WBCS
PH UR STRIP.AUTO: 7 [PH]
PLATELET # BLD AUTO: 333 THOUSANDS/UL (ref 149–390)
PMV BLD AUTO: 10.3 FL (ref 8.9–12.7)
POTASSIUM SERPL-SCNC: 4.3 MMOL/L (ref 3.5–5.3)
PROT SERPL-MCNC: 7.8 G/DL (ref 6.4–8.2)
PROT UR STRIP-MCNC: NEGATIVE MG/DL
RBC # BLD AUTO: 4.5 MILLION/UL (ref 3.81–5.12)
RBC #/AREA URNS AUTO: ABNORMAL /HPF
SODIUM SERPL-SCNC: 137 MMOL/L (ref 136–145)
SP GR UR STRIP.AUTO: 1.02 (ref 1–1.03)
TSH SERPL DL<=0.05 MIU/L-ACNC: 0.47 UIU/ML (ref 0.36–3.74)
UROBILINOGEN UR QL STRIP.AUTO: 0.2 E.U./DL
WBC # BLD AUTO: 13.27 THOUSAND/UL (ref 4.31–10.16)
WBC #/AREA URNS AUTO: ABNORMAL /HPF

## 2021-06-26 PROCEDURE — 81001 URINALYSIS AUTO W/SCOPE: CPT | Performed by: EMERGENCY MEDICINE

## 2021-06-26 PROCEDURE — 83690 ASSAY OF LIPASE: CPT | Performed by: EMERGENCY MEDICINE

## 2021-06-26 PROCEDURE — 80053 COMPREHEN METABOLIC PANEL: CPT | Performed by: EMERGENCY MEDICINE

## 2021-06-26 PROCEDURE — 36415 COLL VENOUS BLD VENIPUNCTURE: CPT | Performed by: EMERGENCY MEDICINE

## 2021-06-26 PROCEDURE — 96374 THER/PROPH/DIAG INJ IV PUSH: CPT

## 2021-06-26 PROCEDURE — 81025 URINE PREGNANCY TEST: CPT | Performed by: EMERGENCY MEDICINE

## 2021-06-26 PROCEDURE — 85025 COMPLETE CBC W/AUTO DIFF WBC: CPT | Performed by: EMERGENCY MEDICINE

## 2021-06-26 PROCEDURE — 96361 HYDRATE IV INFUSION ADD-ON: CPT

## 2021-06-26 PROCEDURE — 99284 EMERGENCY DEPT VISIT MOD MDM: CPT | Performed by: EMERGENCY MEDICINE

## 2021-06-26 PROCEDURE — 84443 ASSAY THYROID STIM HORMONE: CPT | Performed by: EMERGENCY MEDICINE

## 2021-06-26 PROCEDURE — 99284 EMERGENCY DEPT VISIT MOD MDM: CPT

## 2021-06-26 PROCEDURE — 96375 TX/PRO/DX INJ NEW DRUG ADDON: CPT

## 2021-06-26 RX ORDER — NAPROXEN 250 MG/1
250 TABLET ORAL 2 TIMES DAILY WITH MEALS
Qty: 20 TABLET | Refills: 0 | Status: SHIPPED | OUTPATIENT
Start: 2021-06-26 | End: 2021-09-28

## 2021-06-26 RX ORDER — CETIRIZINE HYDROCHLORIDE 10 MG/1
10 TABLET ORAL DAILY
COMMUNITY

## 2021-06-26 RX ORDER — KETOROLAC TROMETHAMINE 30 MG/ML
15 INJECTION, SOLUTION INTRAMUSCULAR; INTRAVENOUS ONCE
Status: COMPLETED | OUTPATIENT
Start: 2021-06-26 | End: 2021-06-26

## 2021-06-26 RX ORDER — ONDANSETRON 2 MG/ML
4 INJECTION INTRAMUSCULAR; INTRAVENOUS ONCE
Status: COMPLETED | OUTPATIENT
Start: 2021-06-26 | End: 2021-06-26

## 2021-06-26 RX ADMIN — SODIUM CHLORIDE 1000 ML: 0.9 INJECTION, SOLUTION INTRAVENOUS at 17:35

## 2021-06-26 RX ADMIN — ONDANSETRON 4 MG: 2 INJECTION INTRAMUSCULAR; INTRAVENOUS at 17:02

## 2021-06-26 RX ADMIN — KETOROLAC TROMETHAMINE 15 MG: 30 INJECTION, SOLUTION INTRAMUSCULAR; INTRAVENOUS at 17:03

## 2021-06-26 NOTE — ED PROVIDER NOTES
Pt Name: Charmayne Davenport  MRN: [id number]  Robbiegfgustavo 1985  Age/Sex: 38 y o  female  Date of evaluation: 6/26/2021  PCP: Teodora Hardy MD    [address] [place] [address]    Chief Complaint   Patient presents with    Abdominal Cramping     pt presents with abdominal cramping  pt states to have period on june 4th and states to have had intermittent bleeding since  pt states to have called OBGYN and informed to come to ED to be evaluted in cramping continues          HPI    38 y o  female presenting with abdominal cramping  Patient states she had period on June 4th but began having intermittent bleeding and spotting for the past few weeks since then  The cramping is moderate to severe dull the abdomen worse with walking or pressing on the area and better rest   She  States that she called her OBGYN doctor and was told the ER for further evaluation  She denies fever, nausea vomiting, vaginal discharge, diarrhea trauma , other symptoms  HPI      Past Medical and Surgical History    History reviewed  No pertinent past medical history  Past Surgical History:   Procedure Laterality Date    LITHOTRIPSY         History reviewed  No pertinent family history  Social History     Tobacco Use    Smoking status: Never Smoker    Smokeless tobacco: Never Used   Vaping Use    Vaping Use: Never used   Substance Use Topics    Alcohol use: Yes     Comment: occasional    Drug use: Never           Allergies    No Known Allergies    Home Medications    Prior to Admission medications    Not on File           Review of Systems    Review of Systems   Constitutional: Negative for activity change, chills and fever  HENT: Negative for drooling and facial swelling  Eyes: Negative for pain, discharge and visual disturbance  Respiratory: Negative for apnea, cough, chest tightness, shortness of breath and wheezing  Cardiovascular: Negative for chest pain and leg swelling  Gastrointestinal: Positive for abdominal pain  Negative for constipation, diarrhea, nausea and vomiting  Genitourinary: Positive for vaginal bleeding  Negative for difficulty urinating, dysuria and urgency  Musculoskeletal: Negative for arthralgias, back pain and gait problem  Skin: Negative for color change and rash  Neurological: Negative for dizziness, speech difficulty, weakness and headaches  Psychiatric/Behavioral: Negative for agitation, behavioral problems and confusion  All other systems reviewed and negative  Physical Exam      ED Triage Vitals   Temperature Pulse Respirations Blood Pressure SpO2   06/26/21 1614 06/26/21 1614 06/26/21 1614 06/26/21 1614 06/26/21 1614   99 3 °F (37 4 °C) 97 16 139/79 100 %      Temp Source Heart Rate Source Patient Position - Orthostatic VS BP Location FiO2 (%)   06/26/21 1614 06/26/21 1614 06/26/21 1614 06/26/21 1614 --   Oral Monitor Sitting Left arm       Pain Score       06/26/21 1735       3               Physical Exam  Vitals and nursing note reviewed  Constitutional:       Appearance: She is well-developed  HENT:      Head: Normocephalic and atraumatic  Right Ear: External ear normal       Left Ear: External ear normal    Eyes:      Conjunctiva/sclera: Conjunctivae normal       Pupils: Pupils are equal, round, and reactive to light  Cardiovascular:      Rate and Rhythm: Normal rate and regular rhythm  Heart sounds: Normal heart sounds  Pulmonary:      Effort: Pulmonary effort is normal  No respiratory distress  Breath sounds: Normal breath sounds  No wheezing or rales  Abdominal:      General: There is no distension  Palpations: Abdomen is soft  Tenderness: There is abdominal tenderness  There is no guarding or rebound  Comments: Mild tenderness to palpation the left lower and right lower quadrants, no rebound or guarding     Genitourinary:     Comments: Patient offered pelvic exam but declined at this time, preferring that that be performed by her OBGYN doctor  Musculoskeletal:         General: No deformity  Normal range of motion  Cervical back: Normal range of motion and neck supple  Skin:     General: Skin is warm and dry  Findings: No erythema or rash  Neurological:      Mental Status: She is alert and oriented to person, place, and time  Psychiatric:         Behavior: Behavior normal          Thought Content: Thought content normal          Judgment: Judgment normal               Diagnostic Results      Labs:    Results Reviewed     Procedure Component Value Units Date/Time    TSH [[id number]]  (Normal) Collected: 06/26/21 1705    Lab Status: Final result Specimen: Blood from Arm, Right Updated: 06/26/21 1741     TSH 3RD GENERATON 0 466 uIU/mL     Narrative:      Patients undergoing fluorescein dye angiography may retain small amounts of fluorescein in the body for 48-72 hours post procedure  Samples containing fluorescein can produce falsely depressed TSH values  If the patient had this procedure,a specimen should be resubmitted post fluorescein clearance        Comprehensive metabolic panel [[id number]] Collected: 06/26/21 1705    Lab Status: Final result Specimen: Blood from Arm, Right Updated: 06/26/21 1727     Sodium 137 mmol/L      Potassium 4 3 mmol/L      Chloride 103 mmol/L      CO2 27 mmol/L      ANION GAP 7 mmol/L      BUN 16 mg/dL      Creatinine 1 01 mg/dL      Glucose 89 mg/dL      Calcium 9 7 mg/dL      AST 19 U/L      ALT 20 U/L      Alkaline Phosphatase 72 U/L      Total Protein 7 8 g/dL      Albumin 3 9 g/dL      Total Bilirubin 0 72 mg/dL      eGFR 83 ml/min/1 73sq m     Narrative:      [place] guidelines for Chronic Kidney Disease (CKD):     Stage 1 with normal or high GFR (GFR > 90 mL/min/1 73 square meters)    Stage 2 Mild CKD (GFR = 60-89 mL/min/1 73 square meters)    Stage 3A Moderate CKD (GFR = 45-59 mL/min/1 73 square meters)    Stage 3B Moderate CKD (GFR = 30-44 mL/min/1 73 square meters)    Stage 4 Severe CKD (GFR = 15-29 mL/min/1 73 square meters)    Stage 5 End Stage CKD (GFR <15 mL/min/1 73 square meters)  Note: GFR calculation is accurate only with a steady state creatinine    Lipase [[id number]]  (Abnormal) Collected: 06/26/21 1705    Lab Status: Final result Specimen: Blood from Arm, Right Updated: 06/26/21 1727     Lipase 52 u/L     Urine Microscopic [[id number]]  (Abnormal) Collected: 06/26/21 1705    Lab Status: Final result Specimen: Urine, Clean Catch Updated: 06/26/21 1722     RBC, UA 1-2 /hpf      WBC, UA 0-1 /hpf      Epithelial Cells Moderate /hpf      Bacteria, UA Occasional /hpf     CBC and differential [[id number]]  (Abnormal) Collected: 06/26/21 1705    Lab Status: Final result Specimen: Blood from Arm, Right Updated: 06/26/21 1712     WBC 13 27 Thousand/uL      RBC 4 50 Million/uL      Hemoglobin 13 3 g/dL      Hematocrit 41 3 %      MCV 92 fL      MCH 29 6 pg      MCHC 32 2 g/dL      RDW 12 8 %      MPV 10 3 fL      Platelets 704 Thousands/uL      nRBC 0 /100 WBCs      Neutrophils Relative 84 %      Immat GRANS % 0 %      Lymphocytes Relative 13 %      Monocytes Relative 3 %      Eosinophils Relative 0 %      Basophils Relative 0 %      Neutrophils Absolute 11 09 Thousands/µL      Immature Grans Absolute 0 05 Thousand/uL      Lymphocytes Absolute 1 69 Thousands/µL      Monocytes Absolute 0 36 Thousand/µL      Eosinophils Absolute 0 03 Thousand/µL      Basophils Absolute 0 05 Thousands/µL     UA (URINE) with reflex to Scope [[id number]]  (Abnormal) Collected: 06/26/21 1705    Lab Status: Final result Specimen: Urine, Clean Catch Updated: 06/26/21 1711     Color, UA Yellow     Clarity, UA Slightly Cloudy     Specific [place], UA 1 020     pH, UA 7 0     Leukocytes, UA Negative     Nitrite, UA Negative     Protein, UA Negative mg/dl      Glucose, UA Negative mg/dl      Ketones, UA 40 (2+) mg/dl      Urobilinogen, UA 0 2 E U /dl      Bilirubin, UA Negative     Blood, UA Moderate    POCT pregnancy, urine [[id number]]  (Normal) Resulted: 06/26/21 1657    Lab Status: Final result Updated: 06/26/21 1658     EXT PREG TEST UR (Ref: Negative) negative     Control vaild          All labs reviewed and utilized in the medical decision making process    Radiology:    No orders to display       All radiology studies independently viewed by me and interpreted by the radiologist     Procedure    Procedures        ED Course of Care and Re-Assessments      Symptoms resolved with Zofran Toradol and IV fluids  Medications   ondansetron (ZOFRAN) injection 4 mg (4 mg Intravenous Given 6/26/21 1702)   ketorolac (TORADOL) injection 15 mg (15 mg Intravenous Given 6/26/21 1703)   sodium chloride 0 9 % bolus 1,000 mL (0 mL Intravenous Stopped 6/26/21 1833)           FINAL IMPRESSION    Final diagnoses:   Abdominal pain, unspecified abdominal location   Dehydration   Dysfunctional uterine bleeding         DISPOSITION/PLAN    Presentation as above with abdominal cramping, dehydration, dysfunctional uterine bleeding  Laboratory workup overall reassuring, signed significant anemia, low suspicion for significant intra-abdominal infection, PID, TOA, ovarian torsion, appendicitis, cholecystitis, other acute life threat at this time  Treated symptomatically, discharged strict precautions , follow up with primary care doctor  Time reflects when diagnosis was documented in both MDM as applicable and the Disposition within this note     Time User Action Codes Description Comment    6/26/2021  6:23 PM Laurie CHOE Add [R10 9] Abdominal pain, unspecified abdominal location     6/26/2021  6:24 PM Laurie CHOE Add [E86 0] Dehydration     6/26/2021  6:26 PM Enrrique Knox Add [N93 8] Dysfunctional uterine bleeding       ED Disposition     ED Disposition Condition Date/Time Comment    Discharge Stable Sat Jun 26, 2021  6:23 PM Kari Woods discharge to home/self care              Follow-up Information     Follow up With Specialties Details Why Contact Info Additional [address] [facility] Emergency Department Emergency Medicine Go to  If symptoms worsen [address] [facility] Emergency Department, [address], [place], [address], [place] Internal Medicine Call  As needed [address]  [address] [facility]  [address]       Brianne Marion MD  Call in 2 days To discuss this visit and schedule close follow-up for your symptoms [address] \[facility]\""               [facility]:    [facility]  [address] [facility] [address]  [phone]  Go to   If symptoms worsen    Emilie Alcantara MD  [address]  [address] [facility]  [facility]  [phone]    Call   As needed    Brianne Marion MD  [address] [facility] 89  [phone]    Call in 2 days  To discuss this visit and schedule close follow-up for your symptoms      DISCHARGE MEDICATIONS:    Discharge Medication List as of 6/26/2021  6:26 PM      START taking these medications    Details   naproxen (NAPROSYN) 250 mg tablet Take 1 tablet (250 mg total) by mouth 2 (two) times a day with meals, Starting Sat 6/26/2021, Print         CONTINUE these medications which have NOT CHANGED    Details   cetirizine (ZyrTEC) 10 mg tablet Take 10 mg by mouth daily, Historical Med      Multiple Vitamin (MULTIVITAMIN ADULT PO) multivitamin, Historical Med             No discharge procedures on file           MD Leroy Guerrero MD  06/26/21 1950

## 2021-06-28 ENCOUNTER — OFFICE VISIT (OUTPATIENT)
Dept: PHYSICAL THERAPY | Facility: CLINIC | Age: 36
End: 2021-06-28
Payer: COMMERCIAL

## 2021-06-28 DIAGNOSIS — M79.602 LEFT ARM PAIN: ICD-10-CM

## 2021-06-28 DIAGNOSIS — M54.12 CERVICAL RADICULOPATHY: Primary | ICD-10-CM

## 2021-06-28 PROCEDURE — 97110 THERAPEUTIC EXERCISES: CPT

## 2021-06-28 PROCEDURE — 97112 NEUROMUSCULAR REEDUCATION: CPT

## 2021-06-28 PROCEDURE — 97140 MANUAL THERAPY 1/> REGIONS: CPT

## 2021-06-28 NOTE — PROGRESS NOTES
Daily Note     Today's date: 2021  Patient name: Myrla Boxer  : 1985  MRN: [id number]  Referring provider: Bishop Omid MD  Dx:   Encounter Diagnosis     ICD-10-CM    1  Cervical radiculopathy  M54 12    2  Left arm pain  M79 602        Start Time: 1550  Stop Time: 1630  Total time in clinic (min): 40 minutes    Subjective: Patient reports she was really stiff in neck and arm a couple days last week  Stated that stretches helped reduce some stiffness  Objective: See treatment diary below      Assessment: Tolerated treatment well  Patient demonstrated fatigue post treatment and would benefit from continued skilled PT  Patient had relief post man txn from pain  Improved mobility in median nerve noted post nerve glides  This session we added the UBE for endurance and strengthening  Plan: Continue per plan of care        Precautions: NA      Manuals                      Manual traction EG TB TB [facility] NOAH JH        Manual median n  glides   [facility] + MFR UT                     Neuro Re-Ed             Prone extension/rows     20x        Median nerve glide x30 x30 x30 30x 30x        No more dishes   x30 x30 30x 30x        scap squeeze   10"x10 10"x10          Posterior shoulder rolls   20x 20x 20x         Chin tucks   3"x20 20x 20x 20x        Supine ER/HA    YTB 20x 5" YTB 20x        Ther Ex             Thoracic extension over half foam roll  10"x10 10"10 10" 10x         Thoracic rotation   1-2"x10 ea b/l 1-2"x10 ea b/l           Pt ed   5'           Levator scap stretch    30"x3 ea b/l  nv 30" 3x        UT stretch    30"x3 30" 3x 30" 3x        UBE     3/3                                  Ther Activity                                       Gait Training                                       Modalities

## 2021-06-30 ENCOUNTER — APPOINTMENT (OUTPATIENT)
Dept: PHYSICAL THERAPY | Facility: CLINIC | Age: 36
End: 2021-06-30
Payer: COMMERCIAL

## 2021-07-01 ENCOUNTER — OFFICE VISIT (OUTPATIENT)
Dept: PHYSICAL THERAPY | Facility: CLINIC | Age: 36
End: 2021-07-01
Payer: COMMERCIAL

## 2021-07-01 DIAGNOSIS — M79.602 LEFT ARM PAIN: ICD-10-CM

## 2021-07-01 DIAGNOSIS — M54.12 CERVICAL RADICULOPATHY: Primary | ICD-10-CM

## 2021-07-01 PROCEDURE — 97140 MANUAL THERAPY 1/> REGIONS: CPT

## 2021-07-01 PROCEDURE — 97110 THERAPEUTIC EXERCISES: CPT

## 2021-07-01 PROCEDURE — 97112 NEUROMUSCULAR REEDUCATION: CPT

## 2021-07-01 NOTE — PROGRESS NOTES
Daily Note     Today's date: 2021  Patient name: Sal Lagos  : 1985  MRN: [id number]  Referring provider: Tammie Grant MD  Dx:   Encounter Diagnosis     ICD-10-CM    1  Cervical radiculopathy  M54 12    2  Left arm pain  M79 602                   Subjective: Pt reports less intensity of numbness/parasthesias in L arm  Objective: See treatment diary below      Assessment: FOTO scores improved compared to start of therapy  Progressed resistance with supine ER/HA and prone rows  Tactile cues to avoid compensation from UT during exercises  No reports of increased pain t/o visit  Plan: Continue with current POC to address pt deficits        Precautions: NA      Manuals                     Manual traction EG TB TB [facility] TB       Manual median n  glides   [facility] + MFR UT TB                    Neuro Re-Ed             Prone extension/rows     20x 20x extensions, 2# rows 2x10       Median nerve glide x30 x30 x30 30x 30x        No more dishes   x30 x30 30x 30x 30x       scap squeeze   10"x10 10"x10          Posterior shoulder rolls   20x 20x 20x         Chin tucks   3"x20 20x 20x 20x 5" x20       Supine ER/HA    YTB 20x 5" YTB 20x RTB 20x/RTB 20x       Ther Ex             Thoracic extension over half foam roll  10"x10 10"10 10" 10x  10"x10       Thoracic rotation   1-2"x10 ea b/l 1-2"x10 ea b/l           Pt ed   5'           Levator scap stretch    30"x3 ea b/l  nv 30" 3x Held-increase in radic symp       UT stretch    30"x3 30" 3x 30" 3x 30"x3       UBE     3/3 3/3                                 Ther Activity                                       Gait Training                                       Modalities

## 2021-07-05 ENCOUNTER — APPOINTMENT (OUTPATIENT)
Dept: PHYSICAL THERAPY | Facility: CLINIC | Age: 36
End: 2021-07-05
Payer: COMMERCIAL

## 2021-07-06 ENCOUNTER — OFFICE VISIT (OUTPATIENT)
Dept: PHYSICAL THERAPY | Facility: CLINIC | Age: 36
End: 2021-07-06
Payer: COMMERCIAL

## 2021-07-06 DIAGNOSIS — M54.12 CERVICAL RADICULOPATHY: Primary | ICD-10-CM

## 2021-07-06 DIAGNOSIS — M79.602 LEFT ARM PAIN: ICD-10-CM

## 2021-07-06 PROCEDURE — 97112 NEUROMUSCULAR REEDUCATION: CPT

## 2021-07-06 PROCEDURE — 97110 THERAPEUTIC EXERCISES: CPT

## 2021-07-06 PROCEDURE — 97140 MANUAL THERAPY 1/> REGIONS: CPT

## 2021-07-06 NOTE — PROGRESS NOTES
Daily Note     Today's date: 2021  Patient name: Wilton You  : 1985  MRN: [id number]  Referring provider: Bhanu Mason MD  Dx:   Encounter Diagnosis     ICD-10-CM    1  Cervical radiculopathy  M54 12    2  Left arm pain  M79 602                   Subjective: Pt reports that she feels as though her L radicular symptoms are lessening  Pt reports continued difficulty with reaching OH and carrying heavy objects  Objective: See treatment diary below      Assessment: Added cone reaching/farmers carries to work on patient difficulties with good tolerance/ tactile and visual cues to ensure correct scapular positioning and avoid compensation from UT; fatigue post  Pt educated to be mindful of how she reaches into mailboxes  Progress as able  Plan: Continue with current POC to address pt deficits        Precautions: NA      Manuals                    Manual traction EG TB TB [facility] NOAH JH TB TB      Manual median n  glides   TB [facility] + MFR UT TB       L UT STM       TB      Neuro Re-Ed             Prone extension/rows     20x 20x extensions, 2# rows 2x10 rows 2# 2x10      Median nerve glide x30 x30 x30 30x 30x  30x      No more dishes   x30 x30 30x 30x 30x       scap squeeze   10"x10 10"x10          Posterior shoulder rolls   20x 20x 20x         Chin tucks   3"x20 20x 20x 20x 5" x20       Supine ER/HA    YTB 20x 5" YTB 20x RTB 20x/RTB 20x NP-resume NV      Ther Ex             Thoracic extension over half foam roll  10"x10 10"10 10" 10x  10"x10 10"x10      Thoracic rotation   1-2"x10 ea b/l 1-2"x10 ea b/l           Pt ed   5'           Levator scap stretch    30"x3 ea b/l  nv 30" 3x Held-increase in radic symp 30"x3 ea b/l       UT stretch    30"x3 30" 3x 30" 3x 30"x3 30"x3 ea b/l       UBE     3/3 3/3 3/3                                Ther Activity             U/l carry        10# 3 laps L only       Cone reaching        3 cones 3x  High shelf, 5x low shelf      Gait Training                                       Modalities

## 2021-07-12 ENCOUNTER — OFFICE VISIT (OUTPATIENT)
Dept: PHYSICAL THERAPY | Facility: CLINIC | Age: 36
End: 2021-07-12
Payer: COMMERCIAL

## 2021-07-12 DIAGNOSIS — M79.602 LEFT ARM PAIN: ICD-10-CM

## 2021-07-12 DIAGNOSIS — M54.12 CERVICAL RADICULOPATHY: Primary | ICD-10-CM

## 2021-07-12 PROCEDURE — 97140 MANUAL THERAPY 1/> REGIONS: CPT

## 2021-07-12 PROCEDURE — 97110 THERAPEUTIC EXERCISES: CPT

## 2021-07-12 NOTE — PROGRESS NOTES
Daily Note     Today's date: 2021  Patient name: Mona Daniel  : 1985  MRN: [id number]  Referring provider: Jimmie Dawson MD  Dx:   Encounter Diagnosis     ICD-10-CM    1  Cervical radiculopathy  M54 12    2  Left arm pain  M79 602                   Subjective: Pt reports L first digit hurting today, most radicular symptoms present in first digit  She reports overall improvement in neck/L arm pain  Objective: See treatment diary below      Assessment: Modified tx program to pt tolerance  Pt unable to complete repetitions with prone extensions due to pain in periscap region  Added STM to L periscap region which resulted in less pain to area and post manual traction had less pain in L 1st digit  Progress as able  Plan: Continue with current POC to address pt deficits        Precautions: NA      Manuals                   Manual traction EG TB TB JH JH TB TB TB     Manual median n  glides   TB JH JH + MFR UT TB       L UT STM       TB TB +periscap     Neuro Re-Ed             Prone extension/rows     20x 20x extensions, 2# rows 2x10 rows 2# 2x10 Rows 2# 2x10, 10x 2# extensions     Median nerve glide x30 x30 x30 30x 30x  30x 14x- thenincrease in radic     No more dishes   x30 x30 30x 30x 30x       scap squeeze   10"x10 10"x10          Posterior shoulder rolls   20x 20x 20x         Chin tucks   3"x20 20x 20x 20x 5" x20       Supine ER/HA    YTB 20x 5" YTB 20x RTB 20x/RTB 20x NP-resume NV      Ther Ex             Thoracic extension over half foam roll  10"x10 10"10 10" 10x  10"x10 10"x10 5"x20     Thoracic rotation   1-2"x10 ea b/l 1-2"x10 ea b/l           Pt ed   5'           Levator scap stretch    30"x3 ea b/l  nv 30" 3x Held-increase in radic symp 30"x3 ea b/l  30"x3 ea b/l      UT stretch    30"x3 30" 3x 30" 3x 30"x3 30"x3 ea b/l  30"x3 ea b/l      UBE     3/3 3/3 3/3 4/                               Ther Activity             U/l carry        10# 3 laps L only  10# 3 laps L only     Cone reaching        3 cones 3x  High shelf, 5x low shelf      Gait Training                                       Modalities

## 2021-07-14 ENCOUNTER — OFFICE VISIT (OUTPATIENT)
Dept: PHYSICAL THERAPY | Facility: CLINIC | Age: 36
End: 2021-07-14
Payer: COMMERCIAL

## 2021-07-14 DIAGNOSIS — M79.602 LEFT ARM PAIN: ICD-10-CM

## 2021-07-14 DIAGNOSIS — M54.12 CERVICAL RADICULOPATHY: Primary | ICD-10-CM

## 2021-07-14 PROCEDURE — 97110 THERAPEUTIC EXERCISES: CPT

## 2021-07-14 PROCEDURE — 97112 NEUROMUSCULAR REEDUCATION: CPT

## 2021-07-14 PROCEDURE — 97140 MANUAL THERAPY 1/> REGIONS: CPT

## 2021-07-14 NOTE — PROGRESS NOTES
Daily Note     Today's date: 2021  Patient name: Summer Loomis  : 1985  MRN: [id number]  Referring provider: Magali Alvarenga MD  Dx:   Encounter Diagnosis     ICD-10-CM    1  Cervical radiculopathy  M54 12    2  Left arm pain  M79 602        Start Time: 1805  Stop Time: 1850  Total time in clinic (min): 45 minutes    Subjective: Patient reports she has minimal pain in the neck, reduced tingling in arm  Objective: See treatment diary below      Assessment: Tolerated treatment well  Patient demonstrated fatigue post treatment and would benefit from continued skilled PT  Muscular tightness in L periscap region reduced post manual therapy  Educated patient on reactions to pain and the importance of posture  Plan: Continue per plan of care        Precautions: NA      Manuals                  Manual traction EG TB TB JH JH TB TB TB JH    Manual median n  glides   TB JH JH + MFR UT TB       L UT STM       TB TB +periscap JH + periscap    Neuro Re-Ed             Prone extension/rows     20x 20x extensions, 2# rows 2x10 rows 2# 2x10 Rows 2# 2x10, 10x 2# extensions Rows 2# 2x10, 10x 2# extensions    Median nerve glide x30 x30 x30 30x 30x  30x 14x- thenincrease in radic 20x    No more dishes   x30 x30 30x 30x 30x       scap squeeze   10"x10 10"x10          Posterior shoulder rolls   20x 20x 20x         Chin tucks   3"x20 20x 20x 20x 5" x20       Supine ER/HA    YTB 20x 5" YTB 20x RTB 20x/RTB 20x NP-resume NV      Ther Ex             Thoracic extension over half foam roll  10"x10 10"10 10" 10x  10"x10 10"x10 5"x20 5" 20x    Thoracic rotation   1-2"x10 ea b/l 1-2"x10 ea b/l           Pt ed   5'           Levator scap stretch    30"x3 ea b/l  nv 30" 3x Held-increase in radic symp 30"x3 ea b/l  30"x3 ea b/l  30" 3x    UT stretch    30"x3 30" 3x 30" 3x 30"x3 30"x3 ea b/l  30"x3 ea b/l  30" 3x ea    UBE     3/3 3/3 3/3 4/4 4/4    TRX rows         2x10 Ther Activity             U/l carry        10# 3 laps L only  10# 3 laps L only 10# 3 laps    Cone reaching        3 cones 3x  High shelf, 5x low shelf      Gait Training                                       Modalities

## 2021-07-15 ENCOUNTER — APPOINTMENT (OUTPATIENT)
Dept: PHYSICAL THERAPY | Facility: CLINIC | Age: 36
End: 2021-07-15
Payer: COMMERCIAL

## 2021-07-19 ENCOUNTER — EVALUATION (OUTPATIENT)
Dept: PHYSICAL THERAPY | Facility: CLINIC | Age: 36
End: 2021-07-19
Payer: COMMERCIAL

## 2021-07-19 DIAGNOSIS — M79.602 LEFT ARM PAIN: ICD-10-CM

## 2021-07-19 DIAGNOSIS — M54.12 CERVICAL RADICULOPATHY: Primary | ICD-10-CM

## 2021-07-19 PROCEDURE — 97140 MANUAL THERAPY 1/> REGIONS: CPT | Performed by: PHYSICAL THERAPIST

## 2021-07-19 PROCEDURE — 97110 THERAPEUTIC EXERCISES: CPT | Performed by: PHYSICAL THERAPIST

## 2021-07-19 NOTE — PROGRESS NOTES
PT Re-Evaluation     Today's date: 2021  Patient name: Magno Velasco  : 1985  MRN: [id number]  Referring provider: Mamta Le MD  Dx:   Encounter Diagnosis     ICD-10-CM    1  Cervical radiculopathy  M54 12    2  Left arm pain  M79 602                   Assessment  Assessment details: Berta Woods is a pleasant 38 y o  female who has been receiving PT intervention for cervical radiculopathy  The patient has demonstrated decreased pain, increased strength, increased ROM, increased joint mobility, improved body mechanics, improved gait mechanics , improved posture  and increased activity tolerance since beginning treatment  Primary remaining impairments include:    1)  Decreased thoracic mobility    2)  Decreased thoracic and scapular strength    Impairments: abnormal or restricted ROM, poor posture  and poor body mechanics  Understanding of Dx/Px/POC: excellent  Goals  Short Term Goals: to be achieved by 4 weeks  1) Patient to be independent with basic HEP  -MET  2) Decrease pain to 3/10 at its worst  -MET  3) Increase cervical spine ROM by 5-10 degrees in all deficient planes  -MET  4) Increase UE strength by 1/2 MMT grade in all deficient planes  -MET  5) Improve joint mobility in cervical spine to normal  -MET    Long Term Goals: to be achieved by discharge  1) FOTO equal to or greater than 75  -MET  2) Patient to be independent with comprehensive HEP  -MET  3) Cervical spine ROM WNL all planes to improve a/iadls  -MET   Updated goal: Patient will demonstrate improved thoracic mobility without a sensation of tightness  4) Increase UE strength to 1 MMT grade in all planes to improve a/iadls   -MET  5) Lifting is improved to maximal level of function -PROGRESSING        Plan  Patient would benefit from: PT eval and skilled physical therapy  Planned modality interventions: low level laser therapy, TENS, thermotherapy: hydrocollator packs, ultrasound and cryotherapy  Planned therapy interventions: manual therapy, activity modification, behavior modification, neuromuscular re-education, therapeutic activities, therapeutic exercise, gait training, graded activity, graded exercise, patient education, home exercise program and sensory integrative techniques  Frequency: 2x week  Duration in weeks: 4  Treatment plan discussed with: patient        Subjective Evaluation    History of Present Illness  Mechanism of injury: The patient presents for re-evaluation today  The patient reports improvement in symptoms since beginning skilled physical therapy  The patient reports 3/10 pain at it's worst over the past 24 hours, and reports 90% improvement in overall condition since beginning formal PT care  The patient's chief remaining concern is slight tightness in the neck  Objective     Passive Range of Motion   Cervical/Thoracic Spine   Normal passive range of motion    Joint Play     Hypomobile: T3, T4, T5, T6, T7 and T8     Tests   Cervical   Negative repeated flexion, vertical compression and lumbar distraction  Left   Negative Spurling's Test A and Spurling's Test B  Left Shoulder   Negative ULTT2  Lumbar   Negative vertical compression                Precautions: NA      Manuals 6/14 6/16 6/21 6/23 6/28 7/1 7/6 7/12 7/14 7/19   Supine thoracic HVLAT          EG   Manual traction EG TB TB JH JH TB TB TB JH    Manual median n  glides   TB JH JH + MFR UT TB       L UT STM       TB TB +periscap JH + periscap    Neuro Re-Ed             Prone extension/rows     20x 20x extensions, 2# rows 2x10 rows 2# 2x10 Rows 2# 2x10, 10x 2# extensions Rows 2# 2x10, 10x 2# extensions    Median nerve glide x30 x30 x30 30x 30x  30x 14x- thenincrease in radic 20x    No more dishes   x30 x30 30x 30x 30x       scap squeeze   10"x10 10"x10          Posterior shoulder rolls   20x 20x 20x         Chin tucks   3"x20 20x 20x 20x 5" x20       Supine ER/HA    YTB 20x 5" YTB 20x RTB 20x/RTB 20x NP-resume NV      Ther Ex             Thoracic extension over half foam roll  10"x10 10"10 10" 10x  10"x10 10"x10 5"x20 5" 20x 5"x20   Thoracic rotation   1-2"x10 ea b/l 1-2"x10 ea b/l        5"x20   Pt ed   5'           Levator scap stretch    30"x3 ea b/l  nv 30" 3x Held-increase in radic symp 30"x3 ea b/l  30"x3 ea b/l  30" 3x    UT stretch    30"x3 30" 3x 30" 3x 30"x3 30"x3 ea b/l  30"x3 ea b/l  30" 3x ea    UBE     3/3 3/3 3/3 4/4 4/4 4/4   TRX rows         2x10    Thread the needle          x20   Ther Activity             U/l carry        10# 3 laps L only  10# 3 laps L only 10# 3 laps    Cone reaching        3 cones 3x  High shelf, 5x low shelf      Gait Training                                       Modalities

## 2021-07-19 NOTE — LETTER
2021    [facility]  [address] [facility]  [address]    Patient: Wilton You   YOB: 1985   Date of Visit: 2021     Encounter Diagnosis     ICD-10-CM    1  Cervical radiculopathy  M54 12    2  Left arm pain  M79 602        Dear Dr Nusrat Saxena:    Thank you for your recent referral of Kari Woods  Please review the attached evaluation summary from Kari's recent visit  Please verify that you agree with the plan of care by signing the attached order  If you have any questions or concerns, please do not hesitate to call  I sincerely appreciate the opportunity to share in the care of one of your patients and hope to have another opportunity to work with you in the near future  Sincerely,    Avie Oppenheim, PT      Referring Provider:      I certify that I have read the below Plan of Care and certify the need for these services furnished under this plan of treatment while under my care  [facility]  [address] [facility]  [address] [address]  Via Fax: [phone]          PT Re-Evaluation     Today's date: 2021  Patient name: Wilton You  : 1985  MRN: [id number]  Referring provider: Bhanu Mason MD  Dx:   Encounter Diagnosis     ICD-10-CM    1  Cervical radiculopathy  M54 12    2  Left arm pain  M79 602                   Assessment  Assessment details: Kortney Woods is a pleasant 38 y o  female who has been receiving PT intervention for cervical radiculopathy  The patient has demonstrated decreased pain, increased strength, increased ROM, increased joint mobility, improved body mechanics, improved gait mechanics , improved posture  and increased activity tolerance since beginning treatment        Primary remaining impairments include:    1)  Decreased thoracic mobility    2)  Decreased thoracic and scapular strength    Impairments: abnormal or restricted ROM, poor posture  and poor body mechanics  Understanding of Dx/Px/POC: excellent  Goals  Short Term Goals: to be achieved by 4 weeks  1) Patient to be independent with basic HEP  -MET  2) Decrease pain to 3/10 at its worst  -MET  3) Increase cervical spine ROM by 5-10 degrees in all deficient planes  -MET  4) Increase UE strength by 1/2 MMT grade in all deficient planes  -MET  5) Improve joint mobility in cervical spine to normal  -MET    Long Term Goals: to be achieved by discharge  1) FOTO equal to or greater than 75  -MET  2) Patient to be independent with comprehensive HEP  -MET  3) Cervical spine ROM WNL all planes to improve a/iadls  -MET   Updated goal: Patient will demonstrate improved thoracic mobility without a sensation of tightness  4) Increase UE strength to 1 MMT grade in all planes to improve a/iadls  -MET  5) Lifting is improved to maximal level of function -PROGRESSING        Plan  Patient would benefit from: PT eval and skilled physical therapy  Planned modality interventions: low level laser therapy, TENS, thermotherapy: hydrocollator packs, ultrasound and cryotherapy  Planned therapy interventions: manual therapy, activity modification, behavior modification, neuromuscular re-education, therapeutic activities, therapeutic exercise, gait training, graded activity, graded exercise, patient education, home exercise program and sensory integrative techniques  Frequency: 2x week  Duration in weeks: 4  Treatment plan discussed with: patient        Subjective Evaluation    History of Present Illness  Mechanism of injury: The patient presents for re-evaluation today  The patient reports improvement in symptoms since beginning skilled physical therapy  The patient reports 3/10 pain at it's worst over the past 24 hours, and reports 90% improvement in overall condition since beginning formal PT care  The patient's chief remaining concern is slight tightness in the neck            Objective     Passive Range of Motion   Cervical/Thoracic Spine   Normal passive range of motion    Joint Play     Hypomobile: T3, T4, T5, T6, T7 and T8     Tests   Cervical   Negative repeated flexion, vertical compression and lumbar distraction  Left   Negative Spurling's Test A and Spurling's Test B  Left Shoulder   Negative ULTT2  Lumbar   Negative vertical compression                Precautions: NA      Manuals 6/14 6/16 6/21 6/23 6/28 7/1 7/6 7/12 7/14 7/19   Supine thoracic HVLAT          EG   Manual traction EG TB TB JH JH TB TB TB JH    Manual median n  glides   TB JH JH + MFR UT TB       L UT STM       TB TB +periscap JH + periscap    Neuro Re-Ed             Prone extension/rows     20x 20x extensions, 2# rows 2x10 rows 2# 2x10 Rows 2# 2x10, 10x 2# extensions Rows 2# 2x10, 10x 2# extensions    Median nerve glide x30 x30 x30 30x 30x  30x 14x- thenincrease in radic 20x    No more dishes   x30 x30 30x 30x 30x       scap squeeze   10"x10 10"x10          Posterior shoulder rolls   20x 20x 20x         Chin tucks   3"x20 20x 20x 20x 5" x20       Supine ER/HA    YTB 20x 5" YTB 20x RTB 20x/RTB 20x NP-resume NV      Ther Ex             Thoracic extension over half foam roll  10"x10 10"10 10" 10x  10"x10 10"x10 5"x20 5" 20x 5"x20   Thoracic rotation   1-2"x10 ea b/l 1-2"x10 ea b/l        5"x20   Pt ed   5'           Levator scap stretch    30"x3 ea b/l  nv 30" 3x Held-increase in radic symp 30"x3 ea b/l  30"x3 ea b/l  30" 3x    UT stretch    30"x3 30" 3x 30" 3x 30"x3 30"x3 ea b/l  30"x3 ea b/l  30" 3x ea    UBE     3/3 3/3 3/3 4/4 4/4 4/4   TRX rows         2x10    Thread the needle          x20   Ther Activity             U/l carry        10# 3 laps L only  10# 3 laps L only 10# 3 laps    Cone reaching        3 cones 3x  High shelf, 5x low shelf      Gait Training                                       Modalities

## 2021-07-22 ENCOUNTER — OFFICE VISIT (OUTPATIENT)
Dept: PHYSICAL THERAPY | Facility: CLINIC | Age: 36
End: 2021-07-22
Payer: COMMERCIAL

## 2021-07-22 DIAGNOSIS — M54.12 CERVICAL RADICULOPATHY: Primary | ICD-10-CM

## 2021-07-22 DIAGNOSIS — M79.602 LEFT ARM PAIN: ICD-10-CM

## 2021-07-22 PROCEDURE — 97110 THERAPEUTIC EXERCISES: CPT

## 2021-07-22 PROCEDURE — 97112 NEUROMUSCULAR REEDUCATION: CPT

## 2021-07-22 PROCEDURE — 97140 MANUAL THERAPY 1/> REGIONS: CPT

## 2021-07-22 NOTE — PROGRESS NOTES
Daily Note     Today's date: 2021  Patient name: Adonis Urena  : 1985  MRN: [id number]  Referring provider: Alda Ireland MD  Dx:   Encounter Diagnosis     ICD-10-CM    1  Cervical radiculopathy  M54 12    2  Left arm pain  M79 602                   Subjective: Pt reports only mild radicular symptoms into L thumb  Objective: See treatment diary below      Assessment: Progressed scapular strengthening with good tolerance; visual and verbal cues to ensure correct exercise technique  Pt educated on benefits of thoracic mobility  Progress as able  Plan: Continue with current POC to address pt deficits        Precautions: NA      Manuals    Supine thoracic HVLAT     EG    Manual traction TB TB TB JH  TB   Manual median n  glides TB        L UT STM  TB TB +periscap JH + periscap     Neuro Re-Ed         Prone extension/rows 20x extensions, 2# rows 2x10 rows 2# 2x10 Rows 2# 2x10, 10x 2# extensions Rows 2# 2x10, 10x 2# extensions  Breanne Rows 2x10    Bent over rows w/tband       RTB 2x10   Median nerve glide  30x 14x- thenincrease in radic 20x     B/l ER w/tband       YTB 5"x20   No more dishes  30x        scap squeeze          Posterior shoulder rolls          Chin tucks  5" x20        Supine ER/HA RTB 20x/RTB 20x NP-resume NV       Ther Ex         Thoracic extension over half foam roll 10"x10 10"x10 5"x20 5" 20x 5"x20 5"x20   Thoracic rotation      5"x20 5"x20 ea b/l    Pt ed          Levator scap stretch  Held-increase in radic symp 30"x3 ea b/l  30"x3 ea b/l  30" 3x     UT stretch  30"x3 30"x3 ea b/l  30"x3 ea b/l  30" 3x ea     UBE 3/3 3/3 4/4 4/4 4/4 4/4    TRX rows    2x10     Thread the needle     x20 x20 ea b/l    Ther Activity         U/l carry   10# 3 laps L only  10# 3 laps L only 10# 3 laps              Cone reaching   3 cones 3x  High shelf, 5x low shelf       Gait Training                           Modalities

## 2021-07-26 ENCOUNTER — APPOINTMENT (OUTPATIENT)
Dept: PHYSICAL THERAPY | Facility: CLINIC | Age: 36
End: 2021-07-26
Payer: COMMERCIAL

## 2021-07-28 ENCOUNTER — OFFICE VISIT (OUTPATIENT)
Dept: PHYSICAL THERAPY | Facility: CLINIC | Age: 36
End: 2021-07-28
Payer: COMMERCIAL

## 2021-07-28 DIAGNOSIS — M79.602 LEFT ARM PAIN: ICD-10-CM

## 2021-07-28 DIAGNOSIS — M54.12 CERVICAL RADICULOPATHY: Primary | ICD-10-CM

## 2021-07-28 PROCEDURE — 97140 MANUAL THERAPY 1/> REGIONS: CPT

## 2021-07-28 PROCEDURE — 97112 NEUROMUSCULAR REEDUCATION: CPT

## 2021-07-28 NOTE — PROGRESS NOTES
Daily Note     Today's date: 2021  Patient name: Jaden Deleon  : 1985  MRN: [id number]  Referring provider: Mario Mcclendon MD  Dx:   Encounter Diagnosis     ICD-10-CM    1  Cervical radiculopathy  M54 12    2  Left arm pain  M79 602        Start Time: 1800  Stop Time: 1850  Total time in clinic (min): 50 minutes    Subjective: Patient reports she has no pain or numbness in hand or fingers  Kerline Espinosa reports she has been feeling better lately  Objective: See treatment diary below      Assessment: Tolerated treatment well  Patient demonstrated fatigue post treatment and would benefit from continued skilled PT  Progressed functional lifting w/o c/o increased pain or difficulty  Patient was challenged w/ prone activities  Plan: Continue per plan of care        Precautions: NA      Manuals    Supine thoracic HVLAT     EG    Manual traction [facility] NOAH   JH  TB   Manual median n  glides         L UT STM    JH + periscap     Neuro Re-Ed         Prone extension/rows Prone IYT, W toY 3x10   Rows 2# 2x10, 10x 2# extensions  Breanne Rows 2x10    Bent over rows w/tband       RTB 2x10   Median nerve glide    20x     B/l ER w/tband       YTB 5"x20   No more dishes          scap squeeze          Posterior shoulder rolls          Chin tucks          Supine ER/HA Sitting 30x ER; HA 3-way 20x        Ther Ex         Thoracic extension over half foam roll    5" 20x 5"x20 5"x20   Thoracic rotation      5"x20 5"x20 ea b/l    Pt ed          Levator scap stretch     30" 3x     UT stretch     30" 3x ea     UBE /    TRX rows 3x10   2x10     Thread the needle 20x ea b/l    x20 x20 ea b/l    Ther Activity         U/l carry  10# 2 laps ea hand total 4 laps   10# 3 laps     OH carry 5# 2 laps ea hand total 4 laps        Cone reaching          Gait Training                           Modalities

## 2021-08-02 ENCOUNTER — OFFICE VISIT (OUTPATIENT)
Dept: PHYSICAL THERAPY | Facility: CLINIC | Age: 36
End: 2021-08-02
Payer: COMMERCIAL

## 2021-08-02 DIAGNOSIS — M54.12 CERVICAL RADICULOPATHY: Primary | ICD-10-CM

## 2021-08-02 DIAGNOSIS — M79.602 LEFT ARM PAIN: ICD-10-CM

## 2021-08-02 PROCEDURE — 97110 THERAPEUTIC EXERCISES: CPT | Performed by: PHYSICAL THERAPIST

## 2021-08-02 PROCEDURE — 97140 MANUAL THERAPY 1/> REGIONS: CPT | Performed by: PHYSICAL THERAPIST

## 2021-08-02 PROCEDURE — 97112 NEUROMUSCULAR REEDUCATION: CPT | Performed by: PHYSICAL THERAPIST

## 2021-08-02 NOTE — PROGRESS NOTES
Daily Note/ Discharge Summary     Today's date: 2021  Patient name: Quintin Pderaza  : 1985  MRN: [id number]  Referring provider: Abdi Jenkins MD  Dx:   Encounter Diagnosis     ICD-10-CM    1  Cervical radiculopathy  M54 12    2  Left arm pain  M79 602                   Subjective: Davina Cruz reports that she has had a complete resolution of her symptoms at this time  She would like to d/c to HEP  Objective: See treatment diary below      Assessment: Tolerated treatment well  Patient has good form throughout therapy  Reviewed HEP  Patient is able to return demonstrate without difficulty         Plan: d/c to HEP     Precautions: NA      Manuals    Supine thoracic HVLAT     EG    Manual traction [employer] NOAH   JH  TB   Manual median n  glides         L UT STM    [employer] NOAH + periscap     Neuro Re-Ed         Prone extension/rows Prone IYT, W toY 3x10   Rows 2# 2x10, 10x 2# extensions  Breanne Rows 2x10    Bent over rows w/tband       RTB 2x10   Median nerve glide    20x     B/l ER w/tband       YTB 5"x20   No more dishes          scap squeeze          Posterior shoulder rolls          Chin tucks          Supine ER/HA Sitting 30x ER; HA 3-way 20x        Ther Ex         Thoracic extension over half foam roll    5" 20x 5"x20 5"x20   Thoracic rotation      5"x20 5"x20 ea b/l    Pt ed          Levator scap stretch     30" 3x     UT stretch     30" 3x ea     UBE 4/4   4/4 4/4 4/4    TRX rows 3x10   2x10     Thread the needle 20x ea b/l    x20 x20 ea b/l    Ther Activity         U/l carry  10# 2 laps ea hand total 4 laps   10# 3 laps     OH carry 5# 2 laps ea hand total 4 laps        Cone reaching          Gait Training                           Modalities

## 2021-08-04 ENCOUNTER — APPOINTMENT (OUTPATIENT)
Dept: PHYSICAL THERAPY | Facility: CLINIC | Age: 36
End: 2021-08-04
Payer: COMMERCIAL

## 2021-08-10 NOTE — PROGRESS NOTES
8/11/2021      Chief Complaint   Patient presents with    Nephrolithiasis         Assessment and Plan    38 y o  female -- New patient    1  Bilateral intrarenal nephrolithiasis  2  Left renal angiomyolipoma    - Overall asymptomatic  - Discussed conservative measures to minimize future stone formation including increasing fluid hydration to 3 L daily, decreasing sodium and calcium, using citrate in the form of abilio or limes/OJ or lemonade, and decreasing red meat   - Will plan to follow up in 1 year with [facility] and US for continued surveillance  - Will call in the meantime with any questions or concerns  - ER precautions advised for severe pain, fevers, chills, nausea, vomiting  - Will call in the meantime with any questions or concerns  - All questions answered; patient understands and agrees with plan          History of Present Illness  Moy Woods is a 38 y o  female new patient here for evaluation of nephrolithiasis and left renal angiomyolipoma  Denies history of renal stones or recurrent UTIs  Denies gross hematuria, fevers, chills, nausea, vomiting, dysuria, urinary frequency, flank pain, suprapubic pain  She has not passed stones  Denies seeing urology in the past  Denies family history of  malignancies  Review of Systems   Constitutional: Negative for activity change, appetite change, chills and fever  HENT: Negative for congestion and trouble swallowing  Respiratory: Negative for cough and shortness of breath  Cardiovascular: Negative for chest pain, palpitations and leg swelling  Gastrointestinal: Negative for abdominal pain, constipation, diarrhea, nausea and vomiting  Genitourinary: Negative for difficulty urinating, dysuria, flank pain, frequency, hematuria and urgency  Musculoskeletal: Negative for back pain and gait problem  Skin: Negative for wound  Allergic/Immunologic: Negative for immunocompromised state  Neurological: Negative for dizziness and syncope  Hematological: Does not bruise/bleed easily  Psychiatric/Behavioral: Negative for confusion  All other systems reviewed and are negative  Vitals  Vitals:    08/11/21 1004   BP: 108/64   Pulse: 64   Weight: 52 2 kg (115 lb)   Height: 5' (1 524 m)       Physical Exam  Constitutional:       Appearance: Normal appearance  HENT:      Head: Normocephalic  Pulmonary:      Effort: Pulmonary effort is normal    Musculoskeletal:      Cervical back: Normal range of motion  Skin:     General: Skin is warm and dry  Neurological:      General: No focal deficit present  Mental Status: She is alert and oriented to person, place, and time  Psychiatric:         Mood and Affect: Mood normal          Behavior: Behavior normal          Thought Content: Thought content normal          Judgment: Judgment normal            Past History  Past Medical History:   Diagnosis Date    Kidney stone      Social History     Socioeconomic History    Marital status: /Civil Union     Spouse name: None    Number of children: None    Years of education: None    Highest education level: None   Occupational History    None   Tobacco Use    Smoking status: Never Smoker    Smokeless tobacco: Never Used   Vaping Use    Vaping Use: Never used   Substance and Sexual Activity    Alcohol use: Yes     Comment: occasional    Drug use: Never    Sexual activity: None   Other Topics Concern    None   Social History Narrative    None     Social Determinants of Health     Financial Resource Strain:     Difficulty of Paying Living Expenses:    Food Insecurity:     Worried About Running Out of Food in the Last Year:     Ran Out of Food in the Last Year:    Transportation Needs:     Lack of Transportation (Medical):      Lack of Transportation (Non-Medical):    Physical Activity:     Days of Exercise per Week:     Minutes of Exercise per Session:    Stress:     Feeling of Stress :    Social Connections:     Frequency of Communication with Friends and Family:     Frequency of Social Gatherings with Friends and Family:     Attends [employer] Services:     Active Member of Clubs or Organizations:     Attends Club or Organization Meetings:     Marital Status:    Intimate Partner Violence:     Fear of Current or Ex-Partner:     Emotionally Abused:     Physically Abused:     Sexually Abused:      Social History     Tobacco Use   Smoking Status Never Smoker   Smokeless Tobacco Never Used     History reviewed  No pertinent family history  The following portions of the patient's history were reviewed and updated as appropriate: allergies, current medications, past medical history, past social history, past surgical history and problem list     Results  No results found for this or any previous visit (from the past 1 hour(s))  ]  No results found for: PSA  Lab Results   Component Value Date    CALCIUM 9 7 06/26/2021    K 4 3 06/26/2021    CO2 27 06/26/2021     06/26/2021    BUN 16 06/26/2021    CREATININE 1 01 06/26/2021     Lab Results   Component Value Date    WBC 13 27 (H) 06/26/2021    HGB 13 3 06/26/2021    HCT 41 3 06/26/2021    MCV 92 06/26/2021     06/26/2021       Isadora Travis PA-C

## 2021-08-11 ENCOUNTER — OFFICE VISIT (OUTPATIENT)
Dept: UROLOGY | Facility: CLINIC | Age: 36
End: 2021-08-11
Payer: COMMERCIAL

## 2021-08-11 VITALS
BODY MASS INDEX: 22.58 KG/M2 | DIASTOLIC BLOOD PRESSURE: 64 MMHG | SYSTOLIC BLOOD PRESSURE: 108 MMHG | WEIGHT: 115 LBS | HEIGHT: 60 IN | HEART RATE: 64 BPM

## 2021-08-11 DIAGNOSIS — Z87.442 HISTORY OF KIDNEY STONES: Primary | ICD-10-CM

## 2021-08-11 DIAGNOSIS — D17.71 ANGIOMYOLIPOMA OF KIDNEY: ICD-10-CM

## 2021-08-11 PROCEDURE — 99203 OFFICE O/P NEW LOW 30 MIN: CPT | Performed by: PHYSICIAN ASSISTANT

## 2021-09-03 ENCOUNTER — APPOINTMENT (OUTPATIENT)
Dept: RADIOLOGY | Facility: AMBULARY SURGERY CENTER | Age: 36
End: 2021-09-03
Attending: ORTHOPAEDIC SURGERY
Payer: COMMERCIAL

## 2021-09-03 ENCOUNTER — OFFICE VISIT (OUTPATIENT)
Dept: OBGYN CLINIC | Facility: CLINIC | Age: 36
End: 2021-09-03
Payer: COMMERCIAL

## 2021-09-03 VITALS
HEIGHT: 60 IN | DIASTOLIC BLOOD PRESSURE: 96 MMHG | SYSTOLIC BLOOD PRESSURE: 142 MMHG | WEIGHT: 115 LBS | HEART RATE: 85 BPM | BODY MASS INDEX: 22.58 KG/M2

## 2021-09-03 DIAGNOSIS — S69.91XA INJURY OF RIGHT THUMB, INITIAL ENCOUNTER: Primary | ICD-10-CM

## 2021-09-03 DIAGNOSIS — S63.641A RUPTURE OF ULNAR COLLATERAL LIGAMENT OF RIGHT THUMB, INITIAL ENCOUNTER: ICD-10-CM

## 2021-09-03 DIAGNOSIS — S60.011A CONTUSION OF RIGHT THUMB WITHOUT DAMAGE TO NAIL, INITIAL ENCOUNTER: ICD-10-CM

## 2021-09-03 DIAGNOSIS — S69.91XA INJURY OF RIGHT THUMB, INITIAL ENCOUNTER: ICD-10-CM

## 2021-09-03 PROCEDURE — 73140 X-RAY EXAM OF FINGER(S): CPT

## 2021-09-03 PROCEDURE — 99204 OFFICE O/P NEW MOD 45 MIN: CPT | Performed by: ORTHOPAEDIC SURGERY

## 2021-09-03 RX ORDER — SUMATRIPTAN 50 MG/1
TABLET, FILM COATED ORAL
COMMUNITY
End: 2021-09-28

## 2021-09-03 NOTE — LETTER
September 3, 2021     Patient: Priscilla Prescott   YOB: 1985   Date of Visit: 9/3/2021       To Whom it May Concern:    Obey Woods is under my professional care  She was seen in my office on 9/3/2021  She may return to work with limitations No pinching, grasping with the right hand  We will re-evaluate after diagnostic study is reviewed  If you have any questions or concerns, please don't hesitate to call           Sincerely,          Na Downing DO        CC: No Recipients

## 2021-09-03 NOTE — PROGRESS NOTES
Assessment:  1  Injury of right thumb, initial encounter  XR thumb right first digit-min 2v    MRI thumb right wo contrast   2  Contusion of right thumb without damage to nail, initial encounter  Thumb Cude comf/Cool    MRI thumb right wo contrast   3  Rupture of ulnar collateral ligament of right thumb, initial encounter  Thumb Cude comf/Cool    MRI thumb right wo contrast     There is no problem list on file for this patient  Plan      - New x-rays of the right thumb were obtained today and reviewed  -  On exam most of the patient's pain is on the radial aspect of the MP joint of right thumb  Patient has pain with flexion and abduction of the thumb stressing the UCL, but no pain with extension but has more laxity   - This time is recommended to obtain an MRI of the right thumb to further evaluate for UCL tear  - A thumb Comfort Cool brace was provided for the patient the office today and she can wear this while she is at work or when she is doing activities  - A work note was provided for the patient that reads no pinching, grasping of the right hand  - We will follow up with the patient to review the MRI of the right thumb        Subjective:     Patient ID:    Chief Mando Woods 38 y o  female      HPI    Patient comes in today with regards to right thumb pain  The patient reports that the pain is in the radial aspect of the thumb and has been going on for 3 weeks since a hypertension injury  The pain is rated at5 at its best and6 at its worst   The pain is described as dull aching pain  It is worsened with direct impact and pain, and is made better with the thumb being taped  The patient has taken no NSAIDs, has iced and taped the thumb for treatment  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        The following portions of the patient's history were reviewed and updated as appropriate: allergies, current medications, past family history, past social history, past surgical history and problem list         Social History     Socioeconomic History    Marital status: /Civil Union     Spouse name: Not on file    Number of children: Not on file    Years of education: Not on file    Highest education level: Not on file   Occupational History    Not on file   Tobacco Use    Smoking status: Never Smoker    Smokeless tobacco: Never Used   Vaping Use    Vaping Use: Never used   Substance and Sexual Activity    Alcohol use: Yes     Comment: occasional    Drug use: Never    Sexual activity: Not on file   Other Topics Concern    Not on file   Social History Narrative    Not on file     Social Determinants of Health     Financial Resource Strain:     Difficulty of Paying Living Expenses:    Food Insecurity:     Worried About Running Out of Food in the Last Year:     [address] in the Last Year:    Transportation Needs:     Lack of Transportation (Medical):      Lack of Transportation (Non-Medical):    Physical Activity:     Days of Exercise per Week:     Minutes of Exercise per Session:    Stress:     Feeling of Stress :    Social Connections:     Frequency of Communication with Friends and Family:     Frequency of Social Gatherings with Friends and Family:     Attends Sikh Services:     Active Member of Clubs or Organizations:     Attends Club or Organization Meetings:     Marital Status:    Intimate Partner Violence:     Fear of Current or Ex-Partner:     Emotionally Abused:     Physically Abused:     Sexually Abused:      Past Medical History:   Diagnosis Date    Kidney stone      Past Surgical History:   Procedure Laterality Date    EYE SURGERY      both eyes as a child    LITHOTRIPSY       No Known Allergies  Current Outpatient Medications on File Prior to Visit   Medication Sig Dispense Refill    cetirizine (ZyrTEC) 10 mg tablet Take 10 mg by mouth daily      Multiple Vitamin (MULTIVITAMIN ADULT PO) multivitamin      naproxen (NAPROSYN) 250 mg tablet Take 1 tablet (250 mg total) by mouth 2 (two) times a day with meals (Patient not taking: Reported on 8/11/2021) 20 tablet 0    SUMAtriptan (IMITREX) 50 mg tablet sumatriptan 50 mg tablet       No current facility-administered medications on file prior to visit  Objective:    Review of Systems   Constitutional: Negative for chills, fever and unexpected weight change  HENT: Negative for hearing loss, nosebleeds and sore throat  Eyes: Negative for pain, redness and visual disturbance  Respiratory: Negative for cough, shortness of breath and wheezing  Cardiovascular: Negative for chest pain, palpitations and leg swelling  Gastrointestinal: Negative for abdominal pain and nausea  Genitourinary: Negative for dyspareunia, dysuria and frequency  Skin: Negative for rash and wound  Neurological: Negative for dizziness, numbness and headaches  Psychiatric/Behavioral: Negative for decreased concentration and suicidal ideas  The patient is not nervous/anxious  Right Hand Exam     Tenderness   Right hand tenderness location: radial aspect of the thumb at the MP joint, Tenderness in the thenar eminence  Range of Motion   The patient has normal right wrist ROM  Muscle Strength   The patient has normal right wrist strength  Other   Erythema: absent  Sensation: normal  Pulse: present    Comments:  Swelling noted on the radial aspect of the thumb    full composite fist intact  Patient has difficulty with opposition  Pain with flexion and abduction of the thumb when stressing the UCL   No pain with extension of the thumb but has more laxity              Physical Exam  Constitutional:       Appearance: She is well-developed  HENT:      Head: Normocephalic  Eyes:      Conjunctiva/sclera: Conjunctivae normal    Cardiovascular:      Rate and Rhythm: Normal rate     Pulmonary:      Effort: Pulmonary effort is normal    Musculoskeletal:      Cervical back: Normal range of motion  Skin:     General: Skin is warm and dry  Neurological:      Mental Status: She is alert and oriented to person, place, and time  Procedures  No Procedures performed today    I have personally reviewed pertinent films in PACS and my interpretation is Xrays of the right thumb:   No osseous deformity seen on x-ray  Scribe Attestation    I,:  Osbaldo Kramer am acting as a scribe while in the presence of the attending physician :       I,:  Wicho Devine, DO personally performed the services described in this documentation    as scribed in my presence :           Portions of the record may have been created with voice recognition software   Occasional wrong word or "sound a like" substitutions may have occurred due to the inherent limitations of voice recognition software   Read the chart carefully and recognize, using context, where substitutions have occurred

## 2021-09-16 ENCOUNTER — HOSPITAL ENCOUNTER (OUTPATIENT)
Dept: MRI IMAGING | Facility: HOSPITAL | Age: 36
Discharge: HOME | End: 2021-09-16
Attending: ORTHOPAEDIC SURGERY
Payer: COMMERCIAL

## 2021-09-16 DIAGNOSIS — S69.91XA INJURY OF RIGHT THUMB, INITIAL ENCOUNTER: ICD-10-CM

## 2021-09-16 DIAGNOSIS — S60.011A CONTUSION OF RIGHT THUMB WITHOUT DAMAGE TO NAIL, INITIAL ENCOUNTER: ICD-10-CM

## 2021-09-16 DIAGNOSIS — S63.641A RUPTURE OF ULNAR COLLATERAL LIGAMENT OF RIGHT THUMB, INITIAL ENCOUNTER: ICD-10-CM

## 2021-09-16 PROCEDURE — G1004 CDSM NDSC: HCPCS

## 2021-09-16 PROCEDURE — 73218 MRI UPPER EXTREMITY W/O DYE: CPT

## 2021-09-22 ENCOUNTER — OFFICE VISIT (OUTPATIENT)
Dept: OBGYN CLINIC | Facility: CLINIC | Age: 36
End: 2021-09-22
Payer: COMMERCIAL

## 2021-09-22 VITALS
HEART RATE: 65 BPM | HEIGHT: 60 IN | SYSTOLIC BLOOD PRESSURE: 135 MMHG | WEIGHT: 115 LBS | BODY MASS INDEX: 22.58 KG/M2 | DIASTOLIC BLOOD PRESSURE: 80 MMHG

## 2021-09-22 DIAGNOSIS — S63.641A RUPTURE OF RADIAL COLLATERAL LIGAMENT OF RIGHT THUMB, INITIAL ENCOUNTER: Primary | ICD-10-CM

## 2021-09-22 DIAGNOSIS — S66.291A OTHER SPECIFIED INJURY OF EXTENSOR MUSCLE, FASCIA AND TENDON OF RIGHT THUMB AT WRIST AND HAND LEVEL, INITIAL ENCOUNTER: ICD-10-CM

## 2021-09-22 PROCEDURE — 99213 OFFICE O/P EST LOW 20 MIN: CPT | Performed by: PHYSICIAN ASSISTANT

## 2021-09-22 NOTE — PROGRESS NOTES
Assessment/Plan:  1  Grade 2 injury of radial collateral ligament of right thumb, initial encounter     2  Other specified injury of extensor muscle, fascia and tendon of right thumb at wrist and hand level, initial encounter       Patient Active Problem List   Diagnosis    Rupture of radial collateral ligament of right thumb    Other specified injury of extensor muscle, fascia and tendon of right thumb at wrist and hand level, initial encounter       Discussion/Summary:    38 y o  female  with grade 2 injuries right thumb RCL and EPB date of injury approximately 1 month ago     At this time will place the patient into a thumb spica brace that she should wear at all times except for hygiene   She will continue with her current work restrictions   Follow-up in 6 weeks for repeat evaluation if she is still having significant instability from the RCL will refer to hand surgery for possible reconstruction of the ligament        Subjective:   Patient ID: Dhiraj Calloway is a 38 y o  female   HPI    Patient presents to the office for follow up of her thumb MRI review  Since the last visit, the patient reports persistent pain with radial aspect of the thumb  She reports the pain is slightly improved  She has been using a Comfort Cool brace     She notes the metacarpal of the right thumb is slightly more visible at the joint in the left thumb  Otherwise she has no new complaints  Patient denies any new injuries to the right thumb since the last visit       The following portions of the patient's history were reviewed and updated as appropriate: allergies, current medications, past family history, past social history, past surgical history and problem list     Social History     Socioeconomic History    Marital status: /Civil Union     Spouse name: Not on file    Number of children: Not on file    Years of education: Not on file    Highest education level: Not on file   Occupational History    Not on file   Tobacco Use    Smoking status: Never Smoker    Smokeless tobacco: Never Used   Vaping Use    Vaping Use: Never used   Substance and Sexual Activity    Alcohol use: Yes     Comment: occasional    Drug use: Never    Sexual activity: Not on file   Other Topics Concern    Not on file   Social History Narrative    Not on file     Social Determinants of Health     Financial Resource Strain:     Difficulty of Paying Living Expenses:    Food Insecurity:     Worried About Running Out of Food in the Last Year:     [address] in the Last Year:    Transportation Needs:     Lack of Transportation (Medical):  Lack of Transportation (Non-Medical):    Physical Activity:     Days of Exercise per Week:     Minutes of Exercise per Session:    Stress:     Feeling of Stress :    Social Connections:     Frequency of Communication with Friends and Family:     Frequency of Social Gatherings with Friends and Family:     Attends Islam Services:     Active Member of Clubs or Organizations:     Attends Club or Organization Meetings:     Marital Status:    Intimate Partner Violence:     Fear of Current or Ex-Partner:     Emotionally Abused:     Physically Abused:     Sexually Abused:      Past Medical History:   Diagnosis Date    Kidney stone      Past Surgical History:   Procedure Laterality Date    EYE SURGERY      both eyes as a child    LITHOTRIPSY       No Known Allergies  Current Outpatient Medications on File Prior to Visit   Medication Sig Dispense Refill    cetirizine (ZyrTEC) 10 mg tablet Take 10 mg by mouth daily      Multiple Vitamin (MULTIVITAMIN ADULT PO) multivitamin      naproxen (NAPROSYN) 250 mg tablet Take 1 tablet (250 mg total) by mouth 2 (two) times a day with meals (Patient not taking: Reported on 8/11/2021) 20 tablet 0    SUMAtriptan (IMITREX) 50 mg tablet sumatriptan 50 mg tablet       No current facility-administered medications on file prior to visit         Review of Systems      Objective:    Vitals:    09/22/21 1316   BP: 135/80   Pulse: 65       Physical Exam  Constitutional:       Appearance: She is well-developed  HENT:      Head: Normocephalic and atraumatic  Eyes:      General: No scleral icterus  Conjunctiva/sclera: Conjunctivae normal    Cardiovascular:      Comments: No discernible arrhthymias  Pulmonary:      Effort: Pulmonary effort is normal  No respiratory distress  Breath sounds: No stridor  Abdominal:      General: There is no distension  Palpations: Abdomen is soft  Musculoskeletal:      Cervical back: Neck supple  Skin:     General: Skin is warm and dry  Findings: No erythema  Neurological:      Mental Status: She is alert and oriented to person, place, and time  Psychiatric:         Behavior: Behavior normal          Right Hand Exam     Other   Erythema: absent  Scars: absent  Sensation: normal  Pulse: present    Comments:  Tenderness palpation over the RCL, IP joint dorsally  Patient with full active extension of the thumb MCP and IP joints  Mild obvious laxity with right thumb as compared to the left thumb            I have personally reviewed pertinent films in PACS and my interpretation is MRI of the right thumb reviewed today demonstrates grade to tear of the RCL at the MP joint, grade 2 tear of the EPB  Procedures  No Procedures performed today    Portions of the record may have been created with voice recognition software  Occasional wrong word or "sound a like" substitutions may have occurred due to the inherent limitations of voice recognition software  Read the chart carefully and recognize, using context, where substitutions have occurred

## 2021-09-22 NOTE — LETTER
September 22, 2021     Patient: Sal Lagos   YOB: 1985   Date of Visit: 9/22/2021       To Whom it May Concern:    Argentina Woods is under my professional care  She was seen in my office on 9/22/2021  She may return to work with the same restrictions in place  She must wear the brace on the right hand at all times  These will be in place for least the next 6 weeks       If you have any questions or concerns, please don't hesitate to call           Sincerely,          Fermin Cornelius PA-C        CC: No Recipients

## 2021-09-28 ENCOUNTER — APPOINTMENT (OUTPATIENT)
Dept: LAB | Facility: HOSPITAL | Age: 36
End: 2021-09-28
Attending: STUDENT IN AN ORGANIZED HEALTH CARE EDUCATION/TRAINING PROGRAM
Payer: COMMERCIAL

## 2021-09-28 ENCOUNTER — CONSULT (OUTPATIENT)
Dept: INFECTIOUS DISEASES | Facility: CLINIC | Age: 36
End: 2021-09-28
Payer: COMMERCIAL

## 2021-09-28 ENCOUNTER — HOSPITAL ENCOUNTER (OUTPATIENT)
Dept: RADIOLOGY | Facility: HOSPITAL | Age: 36
Discharge: HOME | End: 2021-09-28
Attending: INTERNAL MEDICINE
Payer: COMMERCIAL

## 2021-09-28 VITALS
BODY MASS INDEX: 22.81 KG/M2 | RESPIRATION RATE: 16 BRPM | SYSTOLIC BLOOD PRESSURE: 136 MMHG | WEIGHT: 116.2 LBS | DIASTOLIC BLOOD PRESSURE: 84 MMHG | HEIGHT: 60 IN | HEART RATE: 88 BPM | TEMPERATURE: 97.1 F

## 2021-09-28 DIAGNOSIS — S13.4XXA: ICD-10-CM

## 2021-09-28 DIAGNOSIS — M77.9 TENDINITIS: ICD-10-CM

## 2021-09-28 DIAGNOSIS — A69.20 LYME DISEASE, UNSPECIFIED: ICD-10-CM

## 2021-09-28 DIAGNOSIS — R53.83 FATIGUE: ICD-10-CM

## 2021-09-28 DIAGNOSIS — A69.20 LYME DISEASE, UNSPECIFIED: Primary | ICD-10-CM

## 2021-09-28 DIAGNOSIS — M54.6 PAIN IN THORACIC SPINE: ICD-10-CM

## 2021-09-28 PROBLEM — D25.1 INTRAMURAL LEIOMYOMA OF UTERUS: Status: ACTIVE | Noted: 2020-10-05

## 2021-09-28 LAB — SARS-COV-2 IGG+IGM SERPL QL IA: NORMAL

## 2021-09-28 PROCEDURE — 99204 OFFICE O/P NEW MOD 45 MIN: CPT | Performed by: STUDENT IN AN ORGANIZED HEALTH CARE EDUCATION/TRAINING PROGRAM

## 2021-09-28 PROCEDURE — 86618 LYME DISEASE ANTIBODY: CPT

## 2021-09-28 PROCEDURE — 87389 HIV-1 AG W/HIV-1&-2 AB AG IA: CPT

## 2021-09-28 PROCEDURE — 72070 X-RAY EXAM THORAC SPINE 2VWS: CPT

## 2021-09-28 PROCEDURE — 36415 COLL VENOUS BLD VENIPUNCTURE: CPT

## 2021-09-28 PROCEDURE — 86769 SARS-COV-2 COVID-19 ANTIBODY: CPT

## 2021-09-28 PROCEDURE — 86592 SYPHILIS TEST NON-TREP QUAL: CPT

## 2021-09-28 PROCEDURE — 72050 X-RAY EXAM NECK SPINE 4/5VWS: CPT

## 2021-09-28 RX ORDER — CEFUROXIME AXETIL 500 MG/1
1 TABLET ORAL EVERY 12 HOURS
COMMUNITY
Start: 2021-09-23 | End: 2021-09-28

## 2021-09-28 NOTE — PATIENT INSTRUCTIONS
-stop the antibiotics  You have been treated for Lyme at this point   -please talk to your doctor about supportive care for your joint and tendon pain  -I will check some blood work to confirm in you had lyme or there are any other infections that need to be treated  -I will call you with the results    -Please get your COVID vaccine!

## 2021-09-28 NOTE — PROGRESS NOTES
Outpatient Consultation - Infectious Disease   Carlitos Woods 38 y o  female MRN: [id number]  Unit/Bed#:  Encounter: [id number]      IMPRESSION & RECOMMENDATIONS:     1  Positive Lyme IgM Western Blot  The patient had Lyme testing ordered in August due to several weeks of fatigue and poor appetite  She denies known tick bites or primary rash  Lyme Ab weakly positive by IgM, Western blot negative IgG testing (only 2 bands) but positive per IgM  The patient now has multiple symptoms including joint aches, poor sleep, fatigue despite nearly a month of antibiotics to treat Lyme with doxycycline and now cefuroxime  I explained that positive Lyme IgM alone may be a false positive or related to cross reactivity to other antibodies, especially if her symptoms were present over a month (she presented right around a month)  She has multiple vague symptoms, which I am not sure are related to Lyme  Regardless, at this point she has received more than sufficient treatment if this were early Lyme (10 days doxycycline recommended)  She has even received enough treatment for Lyme arthritis (28 days), which her symptoms are not characteristic of and she actually has more tendinitis than arthritis  I do not think further antibiotics are going to provide benefit, and are likely causing more harm with side effects  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, for which there are no specific treatments other than supportive care with anti-inflammatories, exercise, etc  Further antibiotics have not shown to be helpful  Symptoms usually resolve over several months if this is the cause  Her symptoms may also be related to depression, for which she was recently started on an SSRI  -stop antibiotics, no further treatment needed for Lyme   -will recheck Lyme Ab with Western Blot to confirm if she did have acute infection, although would not change the treatment plan  At this point she should have IgG Ab response       2  Fatigue  Non-specific, could be related to Lyme, vs depression, vs other condition  She has received sufficient treatment for Lyme and could have a Post treatment syndrome, for which further antibiotics have not shown to be effective     -patient agreeable to checking HIV, RPR   -will check for COVID Ab since she has not been vaccinated  "Long COVID" symptoms can be similar to her presentation    3  Joint pain  Her symptoms appear to be more of a tendinitis than arthritis  Could be reactive to recent viral infection   -supportive care per primary care physician     4  Unvaccinated for COVID-19  Patient states she is just not ready  I encouraged vaccination and explained that a number of young, healthy people have been very sick with COVID  She will think about it  RTC as needed, will call with lab results    HISTORY OF PRESENT ILLNESS:  Reason for Consult: Positive Lyme testing  HPI: Lucas Mcintosh is a 38 y o  woman with a history of adenomyosis and ovarian cysts who presents as a referral to ID for evaluation of positive Lyme antibody testing  The patient states that around June or July, she started to experience trouble sleeping, poor appetite, weakness, headache  She did not notice a tick bite or rash  The patient works as a  so she is outside for her job, but usually wears long clothing  She otherwise spends most of her time inside  A Lyme antibody with reflex to Western blot was ordered by her PCP and performed 8/19, about a month or so from the start of her symptoms  She was also started on an antidepressant  The Lyme Ab profile with weakly positive for IgM, and Western blot showed 2 positive IgM bands and 2 positive IgG bands  She was prescribed doxycycline by her PCP; she completed nearly 3 weeks but this was causing nausea and vomiting so the antibiotic was switched to cefuroxime  She has taken this for about one week   The patient feels like she has gotten worse on the antibiotics and not better  She is having pain in her elbows, right ankle, back, and knees bilaterally  He is also being treated by orthopedic surgery for a torn ligament in her right thumb  This is affecting her sleep  She has no recent GI or URI symptoms  The patient has not received the COVID vaccine because she is "not ready " She has never tested positive for COVID  The patient is sexually active with one male partner (her )  She has a 16year old son  There is no personal or family history of autoimmune conditions  REVIEW OF SYSTEMS:  A complete review of systems are negative other than that noted in the HPI     PAST MEDICAL HISTORY:  Past Medical History:   Diagnosis Date    Kidney stone      Past Surgical History:   Procedure Laterality Date    EYE SURGERY      both eyes as a child    LITHOTRIPSY         FAMILY HISTORY:  Non-contributory  No recurrent infections, autoimmune conditions  Family history of breast cancer    SOCIAL HISTORY:  Social History   Social History     Substance and Sexual Activity   Alcohol Use Yes    Comment: occasional     Social History     Substance and Sexual Activity   Drug Use Never     Social History     Tobacco Use   Smoking Status Never Smoker   Smokeless Tobacco Never Used       ALLERGIES:  Allergies   Allergen Reactions    Doxycycline Nausea Only       MEDICATIONS:  All current active medications have been reviewed    Antibiotics: cefuroxime    PHYSICAL EXAM:  Vitals:    09/28/21 0917   Resp: 16   Temp: (!) 97 1 °F (36 2 °C)   TempSrc: Temporal   Weight: 52 7 kg (116 lb 3 2 oz)   Height: 5' (1 524 m)         General Appearance:  Appearing well, nontoxic, and in no distress   Head:  Normocephalic, without obvious abnormality, atraumatic   Eyes:  Conjunctiva pink and sclera anicteric, both eyes   Nose: Nares normal, mucosa normal, no drainage   Throat: Oropharynx moist without lesions   Neck: Supple, symmetrical, no adenopathy, no tenderness/mass/nodules   Back:   Symmetric, no curvature, ROM normal   Lungs:   Clear to auscultation bilaterally, respirations unlabored   Chest Wall:  No tenderness or deformity   Heart:  RRR; no murmur, rub or gallop   Abdomen:   Soft, non-tender, non-distended, positive bowel sounds,    Extremities: No joint swelling  Tenderness over left elbow at ulnar nerve, lateral ankle ligaments   Skin: No rashes or lesions  No draining wounds noted  Lymph nodes: Cervical, supraclavicular nodes normal   Neurologic: Alert and oriented times 3       LABS, IMAGING, & OTHER STUDIES:  Lab Results:  I have personally reviewed pertinent labs  Lab Results   Component Value Date    K 4 3 06/26/2021     06/26/2021    CO2 27 06/26/2021    BUN 16 06/26/2021    CREATININE 1 01 06/26/2021    GLUF 91 02/13/2021    CALCIUM 9 7 06/26/2021    AST 19 06/26/2021    ALT 20 06/26/2021    ALKPHOS 72 06/26/2021    EGFR 83 06/26/2021     Lab Results   Component Value Date    WBC 13 27 (H) 06/26/2021    HGB 13 3 06/26/2021    HCT 41 3 06/26/2021    MCV 92 06/26/2021     06/26/2021   No results found for: SEDRATE    Micro:    Lyme Antibody and Western Blot 8/19/21:   Ref Range & Units 8/12/19  2:51 PM   LYME AB IGG 0 00 - 0 79 0 08    Comment: NEGATIVE(0 00-0 79)-Absence of detectable Borrelia IgG Antibodies  A negative result does not exclude the possibility of Borrelia infection  If early Lyme disease is suspected,a second sample should be collected & tested 4 weeks after initial testing  LYME AB IGM 0 00 - 0 79 0 34    Comment: NEGATIVE (0 00-0 79)-Absence of detectable Borrelia IgM antibodies  A negative result does not exclude the possibility of Borrelia infection  If early lyme disease is suspected, a second sample should be collected & tested 4 weeks after initial testing       B burgdorgeri IgG Ab Negative  Negative          p18  BB IgG Absent  Absent          p23  BB IgG Absent  Absent          p28  BB IgG Absent  Absent          p30  BB IgG Absent  Absent          p39  BB IgG Absent  Absent          p41  BB IgG Absent  PresentAbnormal           p45  BB IgG Absent  PresentAbnormal           p58  BB IgG Absent  Absent          p66  BB IgG Absent  Absent          p93  BB IgG Absent  Absent          B burgdorgeri IgM Ab Negative  PositiveAbnormal           p23  BB IgM Absent  PresentAbnormal           p39  BB IgM Absent  PresentAbnormal           p41  BB IgM Absent  Absent           Imaging Studies:   I have personally reviewed pertinent imaging study reports and images in PACS  Other Studies:   I have personally reviewed pertinent reports

## 2021-09-29 ENCOUNTER — TELEPHONE (OUTPATIENT)
Dept: INFECTIOUS DISEASES | Facility: CLINIC | Age: 36
End: 2021-09-29

## 2021-09-29 LAB
B BURGDOR IGG+IGM SER-ACNC: 48
HIV 1+2 AB+HIV1 P24 AG SERPL QL IA: NORMAL
RPR SER QL: NORMAL

## 2021-09-29 NOTE — TELEPHONE ENCOUNTER
I spoke with the patient about her blood work  HIV, RPR are negative  COVID antibodies are negative so she has not had a asymptomatic infection, at least not recently  Lyme Ab is also negative; her initial positive IgM was likely a false positive or cross reactive to another infection at the time  I do not think her current symptoms are due to an infectious etiology and I advised her to follow up with her PCP

## 2021-12-15 ENCOUNTER — OFFICE VISIT (OUTPATIENT)
Dept: OBGYN CLINIC | Facility: CLINIC | Age: 36
End: 2021-12-15
Payer: COMMERCIAL

## 2021-12-15 VITALS
WEIGHT: 118 LBS | SYSTOLIC BLOOD PRESSURE: 123 MMHG | DIASTOLIC BLOOD PRESSURE: 82 MMHG | HEIGHT: 60 IN | HEART RATE: 87 BPM | BODY MASS INDEX: 23.16 KG/M2

## 2021-12-15 DIAGNOSIS — S63.641D RUPTURE OF RADIAL COLLATERAL LIGAMENT OF RIGHT THUMB, SUBSEQUENT ENCOUNTER: Primary | ICD-10-CM

## 2021-12-15 PROCEDURE — 99213 OFFICE O/P EST LOW 20 MIN: CPT | Performed by: ORTHOPAEDIC SURGERY

## 2022-01-05 ENCOUNTER — LAB (OUTPATIENT)
Dept: LAB | Facility: HOSPITAL | Age: 37
End: 2022-01-05
Payer: COMMERCIAL

## 2022-01-05 DIAGNOSIS — M79.10 MYALGIA: ICD-10-CM

## 2022-01-05 DIAGNOSIS — R53.83 OTHER FATIGUE: ICD-10-CM

## 2022-01-05 DIAGNOSIS — E55.9 AVITAMINOSIS D: ICD-10-CM

## 2022-01-05 DIAGNOSIS — A08.39 DIARRHEA DUE TO SEVERE ACUTE RESPIRATORY SYNDROME CORONAVIRUS 2 (SARS-COV-2) INFECTION: ICD-10-CM

## 2022-01-05 DIAGNOSIS — U07.1 DIARRHEA DUE TO SEVERE ACUTE RESPIRATORY SYNDROME CORONAVIRUS 2 (SARS-COV-2) INFECTION: ICD-10-CM

## 2022-01-05 LAB
25(OH)D3 SERPL-MCNC: 28.3 NG/ML (ref 30–100)
ALBUMIN SERPL BCP-MCNC: 3.9 G/DL (ref 3.5–5)
ALP SERPL-CCNC: 74 U/L (ref 46–116)
ALT SERPL W P-5'-P-CCNC: 19 U/L (ref 12–78)
ANION GAP SERPL CALCULATED.3IONS-SCNC: 7 MMOL/L (ref 4–13)
AST SERPL W P-5'-P-CCNC: 17 U/L (ref 5–45)
ATRIAL RATE: 63 BPM
BILIRUB SERPL-MCNC: 0.49 MG/DL (ref 0.2–1)
BUN SERPL-MCNC: 14 MG/DL (ref 5–25)
CALCIUM SERPL-MCNC: 9.5 MG/DL (ref 8.3–10.1)
CHLORIDE SERPL-SCNC: 103 MMOL/L (ref 100–108)
CK SERPL-CCNC: 76 U/L (ref 26–192)
CO2 SERPL-SCNC: 30 MMOL/L (ref 21–32)
CREAT SERPL-MCNC: 0.93 MG/DL (ref 0.6–1.3)
ERYTHROCYTE [DISTWIDTH] IN BLOOD BY AUTOMATED COUNT: 13.1 % (ref 11.6–15.1)
FOLATE SERPL-MCNC: 14 NG/ML (ref 3.1–17.5)
GFR SERPL CREATININE-BSD FRML MDRD: 78 ML/MIN/1.73SQ M
GLUCOSE SERPL-MCNC: 87 MG/DL (ref 65–140)
HCT VFR BLD AUTO: 43.2 % (ref 34.8–46.1)
HGB BLD-MCNC: 13.6 G/DL (ref 11.5–15.4)
MCH RBC QN AUTO: 29.6 PG (ref 26.8–34.3)
MCHC RBC AUTO-ENTMCNC: 31.5 G/DL (ref 31.4–37.4)
MCV RBC AUTO: 94 FL (ref 82–98)
P AXIS: 66 DEGREES
PLATELET # BLD AUTO: 318 THOUSANDS/UL (ref 149–390)
PMV BLD AUTO: 10.4 FL (ref 8.9–12.7)
POTASSIUM SERPL-SCNC: 4.4 MMOL/L (ref 3.5–5.3)
PR INTERVAL: 138 MS
PROT SERPL-MCNC: 7.8 G/DL (ref 6.4–8.2)
QRS AXIS: 65 DEGREES
QRSD INTERVAL: 68 MS
QT INTERVAL: 368 MS
QTC INTERVAL: 376 MS
RBC # BLD AUTO: 4.6 MILLION/UL (ref 3.81–5.12)
SODIUM SERPL-SCNC: 140 MMOL/L (ref 136–145)
T WAVE AXIS: 49 DEGREES
TSH SERPL DL<=0.05 MIU/L-ACNC: 0.98 UIU/ML (ref 0.36–3.74)
VENTRICULAR RATE: 63 BPM
VIT B12 SERPL-MCNC: 750 PG/ML (ref 100–900)
WBC # BLD AUTO: 6.1 THOUSAND/UL (ref 4.31–10.16)

## 2022-01-05 PROCEDURE — 82607 VITAMIN B-12: CPT

## 2022-01-05 PROCEDURE — 86769 SARS-COV-2 COVID-19 ANTIBODY: CPT

## 2022-01-05 PROCEDURE — 93005 ELECTROCARDIOGRAM TRACING: CPT

## 2022-01-05 PROCEDURE — 85027 COMPLETE CBC AUTOMATED: CPT

## 2022-01-05 PROCEDURE — 82306 VITAMIN D 25 HYDROXY: CPT

## 2022-01-05 PROCEDURE — 82746 ASSAY OF FOLIC ACID SERUM: CPT

## 2022-01-05 PROCEDURE — 80053 COMPREHEN METABOLIC PANEL: CPT

## 2022-01-05 PROCEDURE — 82085 ASSAY OF ALDOLASE: CPT

## 2022-01-05 PROCEDURE — 84443 ASSAY THYROID STIM HORMONE: CPT

## 2022-01-05 PROCEDURE — 93010 ELECTROCARDIOGRAM REPORT: CPT | Performed by: INTERNAL MEDICINE

## 2022-01-05 PROCEDURE — 36415 COLL VENOUS BLD VENIPUNCTURE: CPT

## 2022-01-05 PROCEDURE — 82550 ASSAY OF CK (CPK): CPT

## 2022-01-06 LAB
ALDOLASE SERPL-CCNC: 3.1 U/L (ref 3.3–10.3)
SARS-COV-2 IGG+IGM SERPL QL IA: NORMAL

## 2022-01-10 ENCOUNTER — HOSPITAL ENCOUNTER (OUTPATIENT)
Dept: NON INVASIVE DIAGNOSTICS | Facility: HOSPITAL | Age: 37
Discharge: HOME | End: 2022-01-10
Attending: INTERNAL MEDICINE
Payer: COMMERCIAL

## 2022-01-10 DIAGNOSIS — R00.2 PALPITATIONS: ICD-10-CM

## 2022-01-10 PROCEDURE — 93226 XTRNL ECG REC<48 HR SCAN A/R: CPT

## 2022-01-10 PROCEDURE — 93225 XTRNL ECG REC<48 HRS REC: CPT

## 2022-01-13 PROCEDURE — 93227 XTRNL ECG REC<48 HR R&I: CPT | Performed by: INTERNAL MEDICINE

## 2022-05-09 ENCOUNTER — HOSPITAL ENCOUNTER (OUTPATIENT)
Dept: RADIOLOGY | Facility: HOSPITAL | Age: 37
Discharge: HOME | End: 2022-05-09
Payer: COMMERCIAL

## 2022-05-09 DIAGNOSIS — M25.511 RIGHT SHOULDER PAIN, UNSPECIFIED CHRONICITY: ICD-10-CM

## 2022-05-09 PROCEDURE — 73030 X-RAY EXAM OF SHOULDER: CPT

## 2022-05-11 ENCOUNTER — OFFICE VISIT (OUTPATIENT)
Dept: OBGYN CLINIC | Facility: CLINIC | Age: 37
End: 2022-05-11
Payer: COMMERCIAL

## 2022-05-11 VITALS — OXYGEN SATURATION: 95 % | BODY MASS INDEX: 23.09 KG/M2 | WEIGHT: 117.6 LBS | HEART RATE: 91 BPM | HEIGHT: 60 IN

## 2022-05-11 DIAGNOSIS — M75.01 ADHESIVE CAPSULITIS OF RIGHT SHOULDER: Primary | ICD-10-CM

## 2022-05-11 PROCEDURE — 20610 DRAIN/INJ JOINT/BURSA W/O US: CPT | Performed by: PHYSICIAN ASSISTANT

## 2022-05-11 PROCEDURE — 99203 OFFICE O/P NEW LOW 30 MIN: CPT | Performed by: PHYSICIAN ASSISTANT

## 2022-05-11 RX ORDER — BUPIVACAINE HYDROCHLORIDE 5 MG/ML
2 INJECTION, SOLUTION EPIDURAL; INTRACAUDAL
Status: COMPLETED | OUTPATIENT
Start: 2022-05-11 | End: 2022-05-11

## 2022-05-11 RX ORDER — TRIAMCINOLONE ACETONIDE 40 MG/ML
80 INJECTION, SUSPENSION INTRA-ARTICULAR; INTRAMUSCULAR
Status: COMPLETED | OUTPATIENT
Start: 2022-05-11 | End: 2022-05-11

## 2022-05-11 RX ORDER — LIDOCAINE HYDROCHLORIDE 10 MG/ML
2 INJECTION, SOLUTION INFILTRATION; PERINEURAL
Status: COMPLETED | OUTPATIENT
Start: 2022-05-11 | End: 2022-05-11

## 2022-05-11 RX ADMIN — LIDOCAINE HYDROCHLORIDE 2 ML: 10 INJECTION, SOLUTION INFILTRATION; PERINEURAL at 15:15

## 2022-05-11 RX ADMIN — BUPIVACAINE HYDROCHLORIDE 2 ML: 5 INJECTION, SOLUTION EPIDURAL; INTRACAUDAL at 15:15

## 2022-05-11 RX ADMIN — TRIAMCINOLONE ACETONIDE 80 MG: 40 INJECTION, SUSPENSION INTRA-ARTICULAR; INTRAMUSCULAR at 15:15

## 2022-05-11 NOTE — LETTER
May 11, 2022     Patient: Laura Macias  YOB: 1985  Date of Visit: 5/11/2022      To Whom it May Concern:    Irisimna Kern Chuck is under my professional care  Irisiman Kern was seen in my office on 5/11/2022  Iris Kern may return to work with limitations : Max lifting 20lbs  No reaching overhead  Follow up in 2-3 weeks for re-evaluation  If you have any questions or concerns, please don't hesitate to call           Sincerely,          Nivia Mcnair PA-C        CC: Iris Woods

## 2022-05-11 NOTE — PROGRESS NOTES
Assessment/Plan   Diagnoses and all orders for this visit:    Adhesive capsulitis of right shoulder    Calcific deposit at the [facility] joint  - Cortisone injection of the [facility] joint today  - Start PT  - Continue Ice, NSAIDs as needed  - Work: Max lifting 20 lbs  No overhead reaching  - Follow up with Dr Yeimi Fitzpatrick in 2-3 weeks            Subjective   Patient ID: Laureen Viramontes is a 39 y o  female  Vitals:    05/11/22 1432   Pulse: 91   SpO2: 95%     37yo female comes in for an evaluation of her right shoulder  She started having shoulder pain on 5-5-22 that rapidly progressed  She now has very little motion in the shoulder  There was no specific injury, but she works as a  which involves a lot of lifting and carrying a heavy bag  The pain is dull in character, moderate in severity, pain does not radiate and is not associated with numbness  The following portions of the patient's history were reviewed and updated as appropriate: allergies, current medications, past family history, past medical history, past social history, past surgical history and problem list     Review of Systems  Ortho Exam  Past Medical History:   Diagnosis Date    Kidney stone      Past Surgical History:   Procedure Laterality Date    EYE SURGERY      both eyes as a child    LITHOTRIPSY       History reviewed  No pertinent family history  Social History     Occupational History    Not on file   Tobacco Use    Smoking status: Never Smoker    Smokeless tobacco: Never Used   Vaping Use    Vaping Use: Never used   Substance and Sexual Activity    Alcohol use: Yes     Comment: occasional    Drug use: Never    Sexual activity: Not on file       Review of Systems   Constitutional: Negative  HENT: Negative  Eyes: Negative  Respiratory: Negative  Cardiovascular: Negative  Gastrointestinal: Negative  Endocrine: Negative  Genitourinary: Negative  Musculoskeletal: As below      Allergic/Immunologic: Negative  Neurological: Negative  Hematological: Negative  Psychiatric/Behavioral: Negative  Objective   Physical Exam        I have personally reviewed pertinent films in PACS and my interpretation is No acute displaced fracture  There is a calcific deposit just superior to the [address] joint       · Constitutional: Awake, Alert, Oriented  · Eyes: EOMI  · Psych: Mood and affect appropriate  · Heart: regular rate   · Lungs: No audible wheezing  · Abdomen: No guarding  · Lymph: no lymphedema       right Shoulder:  - Appearance   No swelling, discoloration, deformity, or ecchymosis  - Palpation   No tenderness to palpation of the clavicle, AC joint, biceps tendon, scapula, or proximal humerus  - ROM   Flexion: active 10  passive 60, ER: 45 and IR: 0  - Motor   Limited by pain  - Special tests   Hawkin's Impingement Test positive  - NVI distally      Large joint arthrocentesis: R glenohumeral  [place] Protocol:  Consent: Verbal consent obtained  Risks and benefits: risks, benefits and alternatives were discussed  Consent given by: patient  Time out: Immediately prior to procedure a "time out" was called to verify the correct patient, procedure, equipment, support staff and site/side marked as required  Timeout called at: 5/11/2022 3:12 PM   Patient understanding: patient states understanding of the procedure being performed  Site marked: the operative site was marked  Radiology Images displayed and confirmed   If images not available, report reviewed: imaging studies available  Patient identity confirmed: verbally with patient    Supporting Documentation  Indications: pain   Procedure Details  Location: shoulder - R glenohumeral  Needle size: 22 G  Ultrasound guidance: no  Approach: posterior  Medications administered: 2 mL lidocaine 1 %; 2 mL bupivacaine (PF) 0 5 %; 80 mg triamcinolone acetonide 40 mg/mL    Patient tolerance: patient tolerated the procedure well with no immediate complications  Dressing:  Sterile dressing applied

## 2022-05-11 NOTE — PATIENT INSTRUCTIONS
Ice Pack Application   WHAT YOU NEED TO KNOW:   Ice can be used to decrease swelling and pain after an injury or surgery  Common injuries that may benefit from ice therapy are sprains, strains, and bruises  The use of ice is most effective in the first 1 to 3 days after an injury  DISCHARGE INSTRUCTIONS:   How to apply ice:   Fill a bag with crushed ice about half full  Remove the air from the bag before you close it  You can also use a bag of frozen vegetables  Wrap the ice pack in a cloth to protect your skin from frostbite or other injury  Put the ice over the injured area for 20 to 30 minutes or as long as directed  Check your skin after about 30 seconds for color changes or blistering  Remove the ice if you notice skin changes or you feel burning or numbness in the area  Throw the ice pack away after use  Apply ice to your injured area 4 times each day or as directed  Ask your healthcare provider how many days you should apply ice  Contact your healthcare provider if:   You see blisters, whitening of your skin, or a bluish color to your skin after using ice  You feel burning or numbness when using ice  You have questions about the use of ice packs  © 2017 [employer]  Information is for End User's use only and may not be sold, redistributed or otherwise used for commercial purposes  All illustrations and images included in CareNotes® are the copyrighted property of [employer]  or [employer]  The above information is an  only  It is not intended as medical advice for individual conditions or treatments  Talk to your doctor, nurse or pharmacist before following any medical regimen to see if it is safe and effective for you  Safe Use of NSAIDs   WHAT YOU NEED TO KNOW:   NSAIDs are medicines that are used to decrease pain, swelling, and fever  NSAIDs are available with or without a doctor's order   NSAIDs that you can buy without a doctor's order include aspirin, ibuprofen, and naproxen  DISCHARGE INSTRUCTIONS:   Return to the emergency department if:   You have swelling around your mouth or trouble breathing  You are breathing fast or you have a fast heartbeat  You have nausea, vomiting, or abdominal pain  You have blood in your vomit or bowel movements  You have a seizure  Contact your healthcare provider if:   You have a headache or become confused  You develop hearing loss or ringing in your ears  You develop itching, a rash, or hives  You have swelling around your lower legs, feet, ankles, and hands  You do not know how much NSAIDs to give to your child  You have questions or concerns about your condition or care  How to give NSAIDs to your child safely:   Read the directions on the label  Find out if the medicine is right for your child's age and how much to give to your child  The dose for your child's weight or age should be listed  Do not  give your child more than the recommended amount  Use the measuring tool that came with the medicine  Do not  use another measuring tool, such as a kitchen spoon  Other measuring tools do not provide the right amount of medicine  How to take NSAIDs safely:   Read the directions on the label to learn how much medicine you should take and often to take it  Do not take more than the recommended amount  Talk to your healthcare provider if you need take NSAIDs for more than 30 days  The longer you take NSAIDs, the higher your risk of side effects will be  You may need to take other medicines to decrease your risk of side effects such as stomach bleeding  Do not take an over-the-counter NSAIDs with prescription NSAIDs  The combined amount of NSAIDs may be too high  Tell your healthcare provider about other medicines you take  Some medicines can increase the risk of side effects from NSAIDs   Your healthcare provider will tell you if it is okay to take NSAIDs and how to take them  Who should not take NSAIDs:  Certain people should avoid or limit NSAIDs  Do not  give NSAIDs to children under 10months of age without direction from your child's doctor  Do not give aspirin to children under 25years of age  Your child could develop Reye syndrome if he takes aspirin  Reye syndrome can cause life-threatening brain and liver damage  Check your child's medicine labels for aspirin, salicylates, or oil of wintergreen  Talk to your healthcare provider before you take NSAIDs if any of the following apply to you: You have reflux disease, a peptic ulcer, H pylori infection, or bleeding in your stomach or intestines  You have a bleeding disorder, or you take blood-thinning medicine  You are allergic to aspirin or other NSAIDs  You have liver or kidney or disease  You have high blood pressure or heart disease  You have 3 or more alcoholic drinks each day  You are pregnant  What you need to know about an NSAID overdose:  Certain health problems can occur if you take too much NSAID medicine at one time or over time  Problems include nausea, vomiting, and abdominal pain  You may develop gastritis, peptic ulcers, and stomach bleeding  You may also develop fluid retention, heart problems, and kidney problems  NSAIDs can worsen high blood pressure  You may become confused, or you may have a headache, hearing loss, or hallucinations  An overdose of aspirin may also cause rapid breathing, a rapid heartbeat, or seizures  What to do if you think you or your child took too much NSAID medicine:  Call the [facility] at [phone] immediately  © 2017 [employer]  Information is for End User's use only and may not be sold, redistributed or otherwise used for commercial purposes  All illustrations and images included in CareNotes® are the copyrighted property of [employer]  or [employer]    The above information is an  only  It is not intended as medical advice for individual conditions or treatments  Talk to your doctor, nurse or pharmacist before following any medical regimen to see if it is safe and effective for you

## 2022-06-01 ENCOUNTER — OFFICE VISIT (OUTPATIENT)
Dept: OBGYN CLINIC | Facility: CLINIC | Age: 37
End: 2022-06-01
Payer: COMMERCIAL

## 2022-06-01 VITALS
HEIGHT: 60 IN | SYSTOLIC BLOOD PRESSURE: 145 MMHG | WEIGHT: 114 LBS | HEART RATE: 77 BPM | DIASTOLIC BLOOD PRESSURE: 85 MMHG | BODY MASS INDEX: 22.38 KG/M2

## 2022-06-01 DIAGNOSIS — S46.011D ROTATOR CUFF STRAIN, RIGHT, SUBSEQUENT ENCOUNTER: Primary | ICD-10-CM

## 2022-06-01 DIAGNOSIS — M75.41 SUBACROMIAL IMPINGEMENT OF RIGHT SHOULDER: ICD-10-CM

## 2022-06-01 DIAGNOSIS — M62.838 TRAPEZIUS MUSCLE SPASM: ICD-10-CM

## 2022-06-01 PROCEDURE — 99213 OFFICE O/P EST LOW 20 MIN: CPT | Performed by: FAMILY MEDICINE

## 2022-06-01 RX ORDER — METHOCARBAMOL 750 MG/1
750 TABLET, FILM COATED ORAL 2 TIMES DAILY PRN
Qty: 60 TABLET | Refills: 0 | Status: SHIPPED | OUTPATIENT
Start: 2022-06-01

## 2022-06-01 NOTE — PROGRESS NOTES
Assessment/Plan:  Assessment/Plan   Diagnoses and all orders for this visit:    Rotator cuff strain, right, subsequent encounter  -     Ambulatory Referral to Physical Therapy; Future    Subacromial impingement of right shoulder  -     Ambulatory Referral to Physical Therapy; Future    Trapezius muscle spasm  -     Ambulatory Referral to Physical Therapy; Future  -     methocarbamol (Robaxin-750) 750 mg tablet; Take 1 tablet (750 mg total) by mouth 2 (two) times a day as needed for muscle spasms      26-year-old right-hand-dominant female with onset of right shoulder pain after work on 05/05/2022  Discussed with patient physical exam, radiographs, impression and plan  X-rays noted for calcification superior aspect of the glenoid  Physical exam cervical spine unremarkable for midline or paraspinal tenderness  She demonstrates intact range of motion of cervical spine  Right shoulder noted for tenderness at the trapezius and lateral aspects  She has range of motion limited to forward flexion of 140°, abduction 90°, and internal rotation to lower lumbar spine  She has 4+/5 external rotation and supraspinatus  There is pain with empty can testing and Polanco maneuver  Clinical impression is that he may be symptomatic from strain to rotator cuff and aggravation of underlying calcific tendinopathy or labral pathology  I discussed treatment of anti-inflammatory, muscle relaxer, supplements, and formal therapy  I recommend she take prescribed naproxen 500 mg twice daily with food for 2 weeks  She may apply topical diclofenac gel 3 to 4 times a day for 10 days  She is to start taking tumeric at least 1000 mg daily and tart cherry at least 1000 mg daily  She may take methocarbamol 750 mg up to 3 times a day as needed but not before driving  She is to start physical therapy as soon as possible and do home exercises as directed    She will follow up in 4 weeks at which point she will be re-evaluated  Subjective:   Patient ID: Brock Moralez is a 39 y o  female  Chief Complaint   Patient presents with    Right Shoulder - Pain       70-year-old right-hand-dominant female presents for evaluation of right shoulder pain onset 05/05/2022  She states pain started after working  She had pain described as sudden in onset, generalized to the shoulder, achy and sore, non radiating, and worse with movement  Symptoms started off as mild intensity  Over the course of next few days symptoms rapidly worsened  She had difficulty moving the arm  She states she was seen by primary care provider and prescribed naproxen  She was referred to orthopedic care  She was seen by Orthopedics 05/11/2022 which point she was given corticosteroid injection to the shoulder and referred to formal therapy  She states that after injection she had improved range of motion in the shoulder and pain intensity decreased  She has not yet started formal therapy  Shoulder Pain  This is a new problem  The current episode started 1 to 4 weeks ago  The problem occurs daily  The problem has been gradually improving  Associated symptoms include arthralgias  Pertinent negatives include no joint swelling, numbness or weakness  Exacerbated by: Arm movement  She has tried rest and NSAIDs (Corticosteroid injection) for the symptoms  The treatment provided moderate relief  The following portions of the patient's history were reviewed and updated as appropriate: She  has a past medical history of Kidney stone  She  has a past surgical history that includes Lithotripsy and Eye surgery  Her family history is not on file  She  reports that she has never smoked  She has never used smokeless tobacco  She reports current alcohol use  She reports that she does not use drugs  She is allergic to doxycycline       Review of Systems   Musculoskeletal: Positive for arthralgias  Negative for joint swelling     Neurological: Negative for weakness and numbness  Objective:  Vitals:    06/01/22 1300   BP: 145/85   Pulse: 77   Weight: 51 7 kg (114 lb)   Height: 5' (1 524 m)     Back Exam     Comments:    Cervical spine  -no tenderness  -normal range of motion  -normal sensation and biceps reflex both upper extremities      Right Hand Exam     Muscle Strength   The patient has normal right wrist strength  Other   Sensation: normal  Pulse: present      Right Elbow Exam     Tenderness   The patient is experiencing no tenderness  Range of Motion   The patient has normal right elbow ROM  Muscle Strength   The patient has normal right elbow strength (5/5 flexion and extension)  Other   Sensation: normal      Right Shoulder Exam     Tenderness   Right shoulder tenderness location: Trapezius, lateral     Range of Motion   Active abduction: 90   Forward flexion: 140   Right shoulder internal rotation 0 degrees: Lower lumbar  Muscle Strength   Right shoulder normal muscle strength: 4+/5 external rotation and supraspinatus  Abduction: 5/5   Internal rotation: 5/5     Tests   Polanco test: positive    Other   Sensation: normal    Comments:  Pain with empty can test  Negative belly press      Left Shoulder Exam     Other   Sensation: normal           Strength/Myotome Testing     Right Wrist/Hand   Normal wrist strength      Physical Exam  Vitals and nursing note reviewed  Constitutional:       General: She is not in acute distress  Appearance: She is well-developed  She is not ill-appearing or diaphoretic  HENT:      Head: Normocephalic  Right Ear: External ear normal       Left Ear: External ear normal    Eyes:      Conjunctiva/sclera: Conjunctivae normal    Neck:      Trachea: No tracheal deviation  Cardiovascular:      Rate and Rhythm: Normal rate  Pulmonary:      Effort: Pulmonary effort is normal  No respiratory distress  Abdominal:      General: There is no distension     Musculoskeletal:         General: Tenderness present  No swelling, deformity or signs of injury  Skin:     General: Skin is warm and dry  Coloration: Skin is not jaundiced or pale  Neurological:      Mental Status: She is alert and oriented to person, place, and time  Psychiatric:         Mood and Affect: Mood normal          Behavior: Behavior normal          Thought Content: Thought content normal          Judgment: Judgment normal          I have personally reviewed pertinent films in PACS and my interpretation is Calcification at superior glenoid rim

## 2022-06-01 NOTE — LETTER
June 1, 2022     Patient: Mable Gómez  YOB: 1985  Date of Visit: 6/1/2022      To Whom it May Concern:    sergoi Jeffries Chuck is under my professional care  Carly Jeffries was seen in my office on 6/1/2022  Carly Jeffries may work with restrictions:  -No pushing, pulling, lifting more than 20 pounds  -No reaching above shoulder level    She is referred to physical therapy  She will be re-evaluated in 4 week s    If you have any questions or concerns, please don't hesitate to call           Sincerely,          [employer], DO        CC: No Recipients

## 2022-06-22 ENCOUNTER — TELEPHONE (OUTPATIENT)
Dept: OBGYN CLINIC | Facility: HOSPITAL | Age: 37
End: 2022-06-22

## 2022-06-22 NOTE — TELEPHONE ENCOUNTER
Patient is calling to check on the status of her work related paperwork  Patient states she went to the [address] office and was advised the paperwork was taken to [facility]  This paperwork is completed and scanned under Media  Patient was advised that she can stop in the office and have this paperwork printed    Patient will stop at the [facility] office today

## 2022-06-30 ENCOUNTER — OFFICE VISIT (OUTPATIENT)
Dept: OBGYN CLINIC | Facility: CLINIC | Age: 37
End: 2022-06-30
Payer: OTHER MISCELLANEOUS

## 2022-06-30 VITALS
WEIGHT: 115 LBS | BODY MASS INDEX: 22.58 KG/M2 | HEIGHT: 60 IN | SYSTOLIC BLOOD PRESSURE: 121 MMHG | HEART RATE: 97 BPM | DIASTOLIC BLOOD PRESSURE: 80 MMHG

## 2022-06-30 DIAGNOSIS — S46.001D ROTATOR CUFF INJURY, RIGHT, SUBSEQUENT ENCOUNTER: Primary | ICD-10-CM

## 2022-06-30 PROCEDURE — 99213 OFFICE O/P EST LOW 20 MIN: CPT | Performed by: FAMILY MEDICINE

## 2022-06-30 NOTE — PROGRESS NOTES
Assessment/Plan:  Assessment/Plan   Diagnoses and all orders for this visit:    Rotator cuff injury, right, subsequent encounter  -     MRI shoulder right wo contrast; Future      78-year-old right-hand-dominant female with onset of right shoulder pain following injury at work on 05/05/2022  Discussed with patient physical exam, imaging studies, impression and plan  X-rays right shoulder are unremarkable for acute osseous abnormality  Physical exam right shoulder noted for tenderness anterior and lateral aspects  She has range of motion limited to forward flexion of 150°, abduction 120°, and internal rotation lumbar spine  She has 4+/5 strength external rotation and supraspinatus  There is pain with empty can testing and Polanco maneuver  She is 8 weeks since injury and still having symptoms despite conservative management of rest, corticosteroid injection, NSAIDs including naproxen 500 mg twice daily, methocarbamol 750 mg twice daily  At this time I will refer for MRI of the right shoulder to evaluate for rotator cuff tear and impingement as invasive management may be warranted  She will follow up after getting MRI done  Subjective:   Patient ID: Leora Vega is a 39 y o  female  Chief Complaint   Patient presents with    Right Shoulder - Follow-up       78-year-old right-hand-dominant female following up for onset of right shoulder pain from injury at work on 05/05/2022  She was last seen by me weeks ago which point she was advised on formal therapy home exercise  She was prescribed naproxen 500 mg twice daily and methocarbamol 750 mg up to 3 times a day  Due to worker's comp challenges she has not yet started formal therapy  She denies any changes in her symptoms since her last visit  She has been experiencing pain described as generalized to the shoulder, achy and sore, nonradiating, worse with movement, associated with limited range of motion, and improved with resting      Shoulder Pain  This is a new problem  The current episode started more than 1 month ago  The problem occurs daily  The problem has been unchanged  Associated symptoms include arthralgias  Pertinent negatives include no joint swelling, numbness or weakness  Exacerbated by: Arm use  She has tried rest, NSAIDs and position changes (Corticosteroid injection, muscle relaxer) for the symptoms  The treatment provided mild relief  Review of Systems   Musculoskeletal: Positive for arthralgias  Negative for joint swelling  Neurological: Negative for weakness and numbness  Objective:  Vitals:    06/30/22 1332   BP: 121/80   Pulse: 97   Weight: 52 2 kg (115 lb)   Height: 5' (1 524 m)     Right Shoulder Exam     Tenderness   Right shoulder tenderness location: Anterior, lateral     Range of Motion   Active abduction: 120   Forward flexion: 140   Internal rotation 0 degrees: Lumbar     Muscle Strength   Right shoulder normal muscle strength: 4+/5 external rotation and supraspinatus  Abduction: 5/5   Internal rotation: 5/5     Tests   Polanco test: positive    Comments:  Pain with empty can test            Physical Exam  Vitals and nursing note reviewed  Constitutional:       General: She is not in acute distress  Appearance: She is well-developed  She is not ill-appearing or diaphoretic  HENT:      Head: Normocephalic  Right Ear: External ear normal       Left Ear: External ear normal    Eyes:      Conjunctiva/sclera: Conjunctivae normal    Neck:      Trachea: No tracheal deviation  Cardiovascular:      Rate and Rhythm: Normal rate  Pulmonary:      Effort: Pulmonary effort is normal  No respiratory distress  Abdominal:      General: There is no distension  Musculoskeletal:         General: Tenderness present  No swelling, deformity or signs of injury  Skin:     General: Skin is warm and dry  Coloration: Skin is not jaundiced or pale     Neurological:      Mental Status: She is alert and oriented to person, place, and time  Psychiatric:         Mood and Affect: Mood normal          Behavior: Behavior normal          Thought Content:  Thought content normal          Judgment: Judgment normal

## 2022-06-30 NOTE — LETTER
June 30, 2022     Patient: Christina Preston  YOB: 1985  Date of Visit: 6/30/2022      To Whom it May Concern:    Bela Jaxnehemiahector Woods is under my professional care  Belacorey Goodman was seen in my office on 6/30/2022  Bela Goodman may work with restrictions:    -No pushing, pulling, lifting more than 20 pounds  -No reaching above shoulder level    She is referred for MRI and will be re-evaluated afterward  If you have any questions or concerns, please don't hesitate to call           Sincerely,          [employer], DO        CC: No Recipients

## 2022-07-25 ENCOUNTER — HOSPITAL ENCOUNTER (OUTPATIENT)
Dept: NEUROLOGY | Facility: HOSPITAL | Age: 37
Discharge: HOME | End: 2022-07-25
Attending: INTERNAL MEDICINE
Payer: COMMERCIAL

## 2022-07-25 DIAGNOSIS — R55 SYNCOPE AND COLLAPSE: ICD-10-CM

## 2022-07-25 PROCEDURE — 95819 EEG AWAKE AND ASLEEP: CPT | Performed by: PSYCHIATRY & NEUROLOGY

## 2022-07-25 PROCEDURE — 95816 EEG AWAKE AND DROWSY: CPT

## 2022-08-08 ENCOUNTER — OFFICE VISIT (OUTPATIENT)
Dept: OBGYN CLINIC | Facility: MEDICAL CENTER | Age: 37
End: 2022-08-08
Payer: COMMERCIAL

## 2022-08-08 ENCOUNTER — APPOINTMENT (OUTPATIENT)
Dept: RADIOLOGY | Facility: MEDICAL CENTER | Age: 37
End: 2022-08-08
Payer: COMMERCIAL

## 2022-08-08 VITALS
WEIGHT: 139 LBS | HEART RATE: 115 BPM | BODY MASS INDEX: 27.29 KG/M2 | DIASTOLIC BLOOD PRESSURE: 91 MMHG | SYSTOLIC BLOOD PRESSURE: 151 MMHG | HEIGHT: 60 IN

## 2022-08-08 DIAGNOSIS — S93.602A SPRAIN OF LEFT FOOT, INITIAL ENCOUNTER: ICD-10-CM

## 2022-08-08 DIAGNOSIS — S93.602A SPRAIN OF LEFT FOOT, INITIAL ENCOUNTER: Primary | ICD-10-CM

## 2022-08-08 PROCEDURE — 99213 OFFICE O/P EST LOW 20 MIN: CPT | Performed by: EMERGENCY MEDICINE

## 2022-08-08 PROCEDURE — 73630 X-RAY EXAM OF FOOT: CPT

## 2022-08-08 NOTE — PROGRESS NOTES
Assessment/Plan:    Diagnoses and all orders for this visit:    Sprain of left foot, initial encounter  -     XR foot 3+ vw left; Future  -     Ambulatory Referral to Physical Therapy; Future    Tender over base 5th MT, repeat Xray wnl  Provided post op shoe, may WBAT  Out of work until next appt, as patient is     F/U 2 weeks    Chief Complaint:     Chief Complaint   Patient presents with    Right Ankle - Pain    Right Foot - Pain       Subjective:   Patient ID: Gris Phelps is a 39 y o  female  Layvonne Flirt comes to the office after experiencing three weeks of left lateral foot pain  She was evaluated on 7/20 with [facility] after a syncopal episode with fall  Describes feeling lower ankle pain and throbbing, but is unable to describe the fall given her syncope  Has been utilizing an ankle brace with great improvement in symptoms  Denies using any oral medication therapies for her symptoms  Pain is worse at the end of the day after standing or bearing weight for multiple hours  Patient describes that she works a job that requires her to stand for multiple hours () at a time and this pain is more noticeable at the end of there shifts  Secondary to the pain, patient has been out of work for the past week  Patient has been utilizing home stretching and exercises at home with minimal improvement in symptoms  Denies numbness of paraesthesias of the foot, changes in toe color, redness, or erythema of the effected extremity  Review of Systems    The following portions of the patient's chart were reviewed and updated as appropriate:      Allergie  Allergies   Allergen Reactions    Doxycycline Nausea Only   s   Allergen Reactions    Doxycycline Nausea Only      Diagnosis Date    Kidney stone        Past Surgical History:   Procedure Laterality Date    EYE SURGERY      both eyes as a child    LITHOTRIPSY         Social History     Socioeconomic History    Marital status: /Civil Union     Spouse name: Not on file    Number of children: Not on file    Years of education: Not on file    Highest education level: Not on file   Occupational History    Not on file   Tobacco Use    Smoking status: Never Smoker    Smokeless tobacco: Never Used   Vaping Use    Vaping Use: Never used   Substance and Sexual Activity    Alcohol use: Yes     Comment: occasional    Drug use: Never    Sexual activity: Not on file   Other Topics Concern    Not on file   Social History Narrative    Not on file     Social Determinants of Health     Financial Resource Strain: Not on file   Food Insecurity: Not on file   Transportation Needs: Not on file   Physical Activity: Not on file   Stress: Not on file   Social Connections: Not on file   Intimate Partner Violence: Not on file   Housing Stability: Not on file       History reviewed  No pertinent family history      Medications:    Current Outpatient Medications:     Aspirin-Acetaminophen-Caffeine (EXCEDRIN PO), Take 2 tablets by mouth as needed, Disp: , Rfl:     cetirizine (ZyrTEC) 10 mg tablet, Take 10 mg by mouth daily, Disp: , Rfl:     methocarbamol (Robaxin-750) 750 mg tablet, Take 1 tablet (750 mg total) by mouth 2 (two) times a day as needed for muscle spasms, Disp: 60 tablet, Rfl: 0    Multiple Vitamin (MULTIVITAMIN ADULT PO), multivitamin, Disp: , Rfl:     Patient Active Problem List   Diagnosis    Rupture of radial collateral ligament of right thumb    Other specified injury of extensor muscle, fascia and tendon of right thumb at wrist and hand level, initial encounter    Intramural leiomyoma of uterus    Lyme disease, unspecified    Tendinitis    Syncope and collapse       Objective:  /91   Pulse (!) 115   Ht 5' (1 524 m)   Wt 63 kg (139 lb)   BMI 27 15 kg/m²     Left Ankle Exam     Tenderness   Left ankle tenderness location: Tenderness of plapation on the base fifth metatarsal    Swelling: none    Range of Motion   The patient has normal left ankle ROM  Left ankle inversion: Reproduction of pain on lateral foot during inversion  Muscle Strength   The patient has normal left ankle strength  Other   Erythema: absent  Scars: absent  Sensation: normal  Pulse: present    Comments:  Pain with varus stress          Observations   Left Ankle/Foot   Negative for adhesive scar  Strength/Myotome Testing     Left Ankle/Foot   Normal strength      Physical Exam      Neurologic Exam    Procedures     Repeat Xray Left foot no acute fracture or signs of prior fracture, no degenerative changes      I have personally reviewed the written report of the pertinent studies  XR FOOT 3+ VW LEFT    Anatomical Region Laterality Modality   Foot left Digital Radiography   Ankle -- --   Lower Extremity -- --       Impression    IMPRESSION:   Normal study  Workstation:[id number]    Narrative    History: [id number]  Left foot pain status post a fall 1 week ago

## 2022-08-08 NOTE — LETTER
August 8, 2022     Patient: Kieran Han  YOB: 1985  Date of Visit: 8/8/2022      To Whom it May Concern:    Rocky Woods is under my professional care  Rocky Mason was seen in my office on 8/8/2022  Work excuse until next appointment in 2 weeks  If you have any questions or concerns, please don't hesitate to call           Sincerely,          Jessi Ballard MD        CC: No Recipients

## 2022-08-08 NOTE — PATIENT INSTRUCTIONS
Ankle Sprain   AMBULATORY CARE:   An ankle sprain  happens when 1 or more ligaments in your ankle joint stretch or tear  Ligaments are tough tissues that connect bones  Ligaments support your joints and keep your bones in place  Common symptoms include the following:   Trouble moving your ankle or foot    Pain when you touch or put weight on your ankle    Bruised, swollen, or misshapen ankle  Seek care immediately if:   You have severe pain in your ankle  Your foot or toes are cold or numb  Your ankle becomes more weak or unstable (wobbly)  You are unable to put any weight on your ankle or foot  Your swelling has increased or returned  Contact your healthcare provider if:   Your pain does not go away, even after treatment  You have questions or concerns about your condition or care  Treatment:   Medicines      NSAIDs , such as ibuprofen, help decrease swelling, pain, and fever  This medicine is available with or without a doctor's order  NSAIDs can cause stomach bleeding or kidney problems in certain people  If you take blood thinner medicine, always ask your healthcare provider if NSAIDs are safe for you  Always read the medicine label and follow directions  Acetaminophen  decreases pain  It is available without a doctor's order  Ask how much to take and how often to take it  Follow directions  Acetaminophen can cause liver damage if not taken correctly  Prescription pain medicine  may be given  Ask how to take this medicine safely  Surgery  may be needed to repair or replace a torn ligament if your sprain does not heal with other treatments  Your healthcare provider may use screws to attach the bones in your ankle together  The screws may help support your ankle and make it stable  Ask your healthcare provider for more information about surgery to treat your ankle sprain    Self care:   Use support devices,  such as a brace, cast, or splint, may be needed to limit your movement and protect your joint  You may need to use crutches to decrease your pain as you move around  Go to physical therapy as directed  A physical therapist teaches you exercises to help improve movement and strength, and to decrease pain  Rest  your ankle so that it can heal  Return to normal activities as directed  Apply ice on your ankle for 15 to 20 minutes every hour or as directed  Use an ice pack, or put crushed ice in a plastic bag  Cover it with a towel  Ice helps prevent tissue damage and decreases swelling and pain  Compress  your ankle  Ask if you should wrap an elastic bandage around your injured ligament  An elastic bandage provides support and helps decrease swelling and movement so your joint can heal  Wear as long as directed  Elevate  your ankle above the level of your heart as often as you can  This will help decrease swelling and pain  Prop your ankle on pillows or blankets to keep it elevated comfortably  Prevent another ankle sprain:   Let your ankle heal   Find out how long your ligament needs to heal  Do not do any physical activity until your healthcare provider says it is okay  If you start activity too soon, you may develop a more serious injury  Always warm up and stretch  before you exercise or play sports  Use the right equipment  Always wear shoes that fit well and are made for the activity that you are doing  You may also need ankle supports, elbow and knee pads, or braces  Follow up with your healthcare provider as directed:  Write down your questions so you remember to ask them during your visits  © 2017 [employer] Information is for End User's use only and may not be sold, redistributed or otherwise used for commercial purposes  All illustrations and images included in CareNotes® are the copyrighted property of [employer]  or Gilberto Lou  The above information is an  only   It is not intended as medical advice for individual conditions or treatments  Talk to your doctor, nurse or pharmacist before following any medical regimen to see if it is safe and effective for you  Ankle Exercises   AMBULATORY CARE:   What you need to know about ankle exercises: Ankle exercises help strengthen your ankle and improve its function after injury  These are beginning exercises  Ask your healthcare provider if you need to see a physical therapist for more advanced exercises  Do these exercises 3 to 5 days a week , or as directed by your healthcare provider  Ask if you should perform the exercises on each ankle  Do the exercises in the order that your healthcare provider recommends  This will help prevent swelling, chronic pain, and reinjury  Start with range of motion exercises  Then progress to strengthening exercises, and finally to balancing exercises  Warm up before you do ankle exercises  Walk or ride a stationary bike for 5 to 10 minutes to prepare your ankle for movement  Stop if you feel pain  It is normal to feel some discomfort at first  Regular exercise will help decrease your discomfort over time  How to perform range of motion exercises safely:  Begin with range of motion exercises to improve flexibility  Ask your healthcare provider when you can progress to strengthening exercises  Ankle alphabet:  Sit on a chair so that your feet do not touch the floor  Use your big toe to write each letter of the alphabet  Use only your foot and ankle, and keep your movements small  Do 2 sets  Calf stretches:      Sitting calf stretches with a towel:  Sit on the floor with both legs out straight in front of you  Loop a towel around the ball of your injured foot  Grasp the ends of the towel and pull it toward you  Keep your leg and back straight  Do not lean forward as you pull the towel  Hold for 30 seconds  Then relax for 30 seconds  Do 2 sets of 10             Standing calf stretches:  Stand facing a wall with the foot that is not injured forward and your knee slightly bent  Keep the leg with the injured foot straight and behind you with your toes pointed in slightly  With both heels flat on the floor, press your hips forward  Do not arch your back  Hold for 30 seconds, and then relax for 30 seconds  Do 2 sets of 10  Repeat with your leg bent  Do 2 sets of 10  How to perform strengthening exercises safely:  After you can perform range of motion exercises without pain, you may begin strengthening exercises  Ask your healthcare provider when you can progress to balancing exercises  Ankle movement in 4 directions:  Sit on the floor with your legs straight in front of you  Keep your heels on the floor for support  Dorsiflexion:  Begin with your toes pointing straight up  Pull your toes toward your body  Slowly return to the starting position  Do 3 sets of 5  Plantar flexion:  Begin with your toes pointing straight up  Push your toes away from your body  Slowly return to the starting position  Do 3 sets of 5  Inversion:  Begin with your toes pointing straight up  Push your toes inward, toward each other  Slowly return to the starting position  Do 3 sets of 5  Eversion:  Begin with your toes pointing straight up  Push your toes outward, away from each other  Slowly return to the starting position  Do 3 sets of 5  Toe curls with a towel:  Sit on a chair so that both of your feet are flat on the floor  Place a small towel on the floor in front of your injured foot  Grab the center of the towel with your toes and curl the towel toward you  Relax and repeat  Do 1 set of 5  [place] pick-ups:  Sit on a chair so that both of your feet are flat on the floor  Place 20 marbles on the floor in front of your injured foot  Use your toes to  one marble at a time and place it into a bowl  Repeat until you have picked up all the marbles  Do 1 set       Heel raises:      Single leg heel raises: Stand with your weight evenly on both feet  Hold on to a chair or a wall for balance  Lift the foot that is not injured off the floor so all your weight is placed on your injured foot  Raise the heel of your injured foot as high as you can  Slowly lower your heel to the floor  Do 1 set of 10  Double leg heel raises:  Stand with your weight evenly on both feet  Hold on to a chair or a wall for balance  Raise both of your heels as high as you can  Slowly lower your heels to the floor  Do 1 set of 10  Heel and toe walks:      Heel walks:  Begin in a standing position  Lift your toes off the floor and walk on your heels  Keep your toes lifted as high as possible  Do 2 sets of 10  Toe walks:  Begin in a standing position  Lift your heels off the floor and walk on the balls and toes of your feet  Keep your heels lifted as high as possible  Do 2 sets of 10  How to perform a balance exercise safely:  After you can perform strengthening exercises without pain, you may do this beginning balancing exercise  Ask your healthcare provider for more advanced balance exercises  Single leg stance:  Stand with your weight evenly on both feet, or hold on to a chair or a wall  Do not lean to the side  Lift the foot that is not injured off the floor so all your weight is placed on your injured foot  Balance on your injured foot  Ask your healthcare provider how long to hold this position  Contact your healthcare provider if:   Your pain becomes worse  You have new pain  You have questions or concerns about your condition, care, or exercise program   © 2017 [employer] Information is for End User's use only and may not be sold, redistributed or otherwise used for commercial purposes  All illustrations and images included in CareNotes® are the copyrighted property of [employer]  or [employer]ber[employer]  The above information is an  only   It is not intended as medical advice for individual conditions or treatments  Talk to your doctor, nurse or pharmacist before following any medical regimen to see if it is safe and effective for you

## 2022-08-09 ENCOUNTER — TELEPHONE (OUTPATIENT)
Dept: OBGYN CLINIC | Facility: HOSPITAL | Age: 37
End: 2022-08-09

## 2022-08-09 NOTE — TELEPHONE ENCOUNTER
Patient called stating that her work is requesting that the left foot gets added to her work note  She was in yesterday  Patient will  note call when completed      # [phone]

## 2022-08-23 ENCOUNTER — OFFICE VISIT (OUTPATIENT)
Dept: OBGYN CLINIC | Facility: MEDICAL CENTER | Age: 37
End: 2022-08-23
Payer: COMMERCIAL

## 2022-08-23 VITALS
HEIGHT: 60 IN | DIASTOLIC BLOOD PRESSURE: 97 MMHG | HEART RATE: 82 BPM | WEIGHT: 114 LBS | SYSTOLIC BLOOD PRESSURE: 153 MMHG | BODY MASS INDEX: 22.38 KG/M2

## 2022-08-23 DIAGNOSIS — S93.602D SPRAIN OF LEFT FOOT, SUBSEQUENT ENCOUNTER: Primary | ICD-10-CM

## 2022-08-23 PROCEDURE — 99213 OFFICE O/P EST LOW 20 MIN: CPT | Performed by: EMERGENCY MEDICINE

## 2022-08-23 NOTE — PATIENT INSTRUCTIONS
You may use Advil (ibuprofen) 600mg every 6 hours or at least twice per day OR Aleve (naproxen) 250-500mg every 12 hours as needed for pain and inflammation  You may also take Tylenol 500mg every 4-6 hours as needed OR max 1,000mg per dose up to 3 times per day for a total of 3,000mg per day  Check with your primary care physician to see if these medications are safe to take and to make sure they do not interfere with your other medications and medical issues

## 2022-08-23 NOTE — PROGRESS NOTES
Assessment/Plan:    Diagnoses and all orders for this visit:    Sprain of left foot, subsequent encounter    Encouraged formal PT  May continue HEP  Ankle  Brace for lateral support  Continue out of work at this time, [facility] [employer] to return to work either before or at next appt (works as )    Return in about 2 weeks (around 9/6/2022)  Chief Complaint:     Chief Complaint   Patient presents with    Left Foot - Follow-up       Subjective:   Patient ID: Ana Cardona is a 39 y o  female  DOI 7/20/22    Patient returns today in post op shoe with improvement of the left lateral foot  She did not start PT but is performing home exercise which we provided previous  She notes improvement with stairs  Review of Systems    The following portions of the patient's chart were reviewed and updated as appropriate:    Allergy:    Allergies   Allergen Reactions    Doxycycline Nausea Only         Past Medical History:   Diagnosis Date    Kidney stone        Past Surgical History:   Procedure Laterality Date    EYE SURGERY      both eyes as a child    LITHOTRIPSY         Social History     Socioeconomic History    Marital status: /Civil Union     Spouse name: Not on file    Number of children: Not on file    Years of education: Not on file    Highest education level: Not on file   Occupational History    Not on file   Tobacco Use    Smoking status: Never Smoker    Smokeless tobacco: Never Used   Vaping Use    Vaping Use: Never used   Substance and Sexual Activity    Alcohol use: Yes     Comment: occasional    Drug use: Never    Sexual activity: Not on file   Other Topics Concern    Not on file   Social History Narrative    Not on file     Social Determinants of Health     Financial Resource Strain: Not on file   Food Insecurity: Not on file   Transportation Needs: Not on file   Physical Activity: Not on file   Stress: Not on file   Social Connections: Not on file   Intimate Partner Violence: Not on file   Housing Stability: Not on file       History reviewed  No pertinent family history  Medications:    Current Outpatient Medications:     Aspirin-Acetaminophen-Caffeine (EXCEDRIN PO), Take 2 tablets by mouth as needed, Disp: , Rfl:     cetirizine (ZyrTEC) 10 mg tablet, Take 10 mg by mouth daily, Disp: , Rfl:     methocarbamol (Robaxin-750) 750 mg tablet, Take 1 tablet (750 mg total) by mouth 2 (two) times a day as needed for muscle spasms, Disp: 60 tablet, Rfl: 0    Multiple Vitamin (MULTIVITAMIN ADULT PO), multivitamin, Disp: , Rfl:     Patient Active Problem List   Diagnosis    Rupture of radial collateral ligament of right thumb    Other specified injury of extensor muscle, fascia and tendon of right thumb at wrist and hand level, initial encounter    Intramural leiomyoma of uterus    Lyme disease, unspecified    Tendinitis    Syncope and collapse       Objective:  /97   Pulse 82   Ht 5' (1 524 m)   Wt 51 7 kg (114 lb)   BMI 22 26 kg/m²     Left Ankle Exam     Other   Erythema: absent  Sensation: normal  Pulse: present    Comments:  Foot is nontender to palpation however there is mild pain reproduced with resisted eversion  There is no tenderness to palpation of the lisfranc, and no plantar ecchymosis              Physical Exam      Neurologic Exam    Procedures    I have personally reviewed the written report of the pertinent studies

## 2022-08-23 NOTE — LETTER
August 23, 2022     Patient: Ivania Tello  YOB: 1985  Date of Visit: 8/23/2022      To Whom it May Concern:    Woo Woods is under my professional care  Woo Mora was seen in my office on 8/23/2022  Work excuse until next appointment in 2 weeks  If you have any questions or concerns, please don't hesitate to call           Sincerely,          Christina Louis MD        CC: No Recipients

## 2022-09-02 ENCOUNTER — TELEPHONE (OUTPATIENT)
Dept: OBGYN CLINIC | Facility: HOSPITAL | Age: 37
End: 2022-09-02

## 2022-09-02 NOTE — TELEPHONE ENCOUNTER
Dr Merino Chamber    Patient is asking for a note releasing her back to work Tuesday, 9/6 with no restrictions       Please fax to [phone] attn:Kari CORBIN # [phone]

## 2022-09-13 ENCOUNTER — APPOINTMENT (OUTPATIENT)
Dept: RADIOLOGY | Facility: MEDICAL CENTER | Age: 37
End: 2022-09-13
Payer: COMMERCIAL

## 2022-09-13 ENCOUNTER — OFFICE VISIT (OUTPATIENT)
Dept: OBGYN CLINIC | Facility: MEDICAL CENTER | Age: 37
End: 2022-09-13
Payer: COMMERCIAL

## 2022-09-13 VITALS
DIASTOLIC BLOOD PRESSURE: 80 MMHG | HEART RATE: 106 BPM | SYSTOLIC BLOOD PRESSURE: 137 MMHG | BODY MASS INDEX: 22.19 KG/M2 | HEIGHT: 60 IN | WEIGHT: 113 LBS

## 2022-09-13 DIAGNOSIS — S93.602D SPRAIN OF LEFT FOOT, SUBSEQUENT ENCOUNTER: Primary | ICD-10-CM

## 2022-09-13 DIAGNOSIS — M25.572 ACUTE LEFT ANKLE PAIN: ICD-10-CM

## 2022-09-13 DIAGNOSIS — S93.602D SPRAIN OF LEFT FOOT, SUBSEQUENT ENCOUNTER: ICD-10-CM

## 2022-09-13 PROCEDURE — 99213 OFFICE O/P EST LOW 20 MIN: CPT | Performed by: EMERGENCY MEDICINE

## 2022-09-13 PROCEDURE — 73610 X-RAY EXAM OF ANKLE: CPT

## 2022-09-13 NOTE — LETTER
September 13, 2022     Patient: Venkat Omalley  YOB: 1985  Date of Visit: 9/13/2022      To Whom it May Concern:    Brynn Woods is under my professional care  Brynn Capps was seen in my office on 9/13/2022  Brynn Capps may return to full duty tomorrow 9/14/22  If you have any questions or concerns, please don't hesitate to call           Sincerely,          Es Archuleta MD        CC: No Recipients

## 2022-09-13 NOTE — PROGRESS NOTES
Assessment/Plan:    Diagnoses and all orders for this visit:    Sprain of left foot, subsequent encounter  -     XR ankle 3+ vw left; Future    Acute left ankle pain  -     XR ankle 3+ vw left; Future    Patient wishes to return to full duty tomorrow, note provided    Return if symptoms worsen or fail to improve  Chief Complaint:     Chief Complaint   Patient presents with    Left Foot - Follow-up       Subjective:   Patient ID: Kang Maria is a 39 y o  female  DOI 7/20/22     Patient returns with improvement overall, she notes anterior ankle pain which was recently worsened after riding bike thus she did not return to full duty at work  However she now feels improved  Previous note: Patient returns today in post op shoe with improvement of the left lateral foot  She did not start PT but is performing home exercise which we provided previous  She notes improvement with stairs  Initial note: Yeimy Telles comes to the office after experiencing three weeks of left lateral foot pain  She was evaluated on 7/20 with [facility] after a syncopal episode with fall  Describes feeling lower ankle pain and throbbing, but is unable to describe the fall given her syncope  Review of Systems    The following portions of the patient's chart were reviewed and updated as appropriate:    Allergy:    Allergies   Allergen Reactions    Doxycycline Nausea Only         Past Medical History:   Diagnosis Date    Kidney stone        Past Surgical History:   Procedure Laterality Date    EYE SURGERY      both eyes as a child    LITHOTRIPSY         Social History     Socioeconomic History    Marital status: /Civil Union     Spouse name: Not on file    Number of children: Not on file    Years of education: Not on file    Highest education level: Not on file   Occupational History    Not on file   Tobacco Use    Smoking status: Never Smoker    Smokeless tobacco: Never Used   Vaping Use    Vaping Use: Never used   Substance and Sexual Activity    Alcohol use: Yes     Comment: occasional    Drug use: Never    Sexual activity: Not on file   Other Topics Concern    Not on file   Social History Narrative    Not on file     Social Determinants of Health     Financial Resource Strain: Not on file   Food Insecurity: Not on file   Transportation Needs: Not on file   Physical Activity: Not on file   Stress: Not on file   Social Connections: Not on file   Intimate Partner Violence: Not on file   Housing Stability: Not on file       History reviewed  No pertinent family history  Medications:    Current Outpatient Medications:     Aspirin-Acetaminophen-Caffeine (EXCEDRIN PO), Take 2 tablets by mouth as needed, Disp: , Rfl:     cetirizine (ZyrTEC) 10 mg tablet, Take 10 mg by mouth daily, Disp: , Rfl:     methocarbamol (Robaxin-750) 750 mg tablet, Take 1 tablet (750 mg total) by mouth 2 (two) times a day as needed for muscle spasms, Disp: 60 tablet, Rfl: 0    Multiple Vitamin (MULTIVITAMIN ADULT PO), multivitamin, Disp: , Rfl:     Patient Active Problem List   Diagnosis    Rupture of radial collateral ligament of right thumb    Other specified injury of extensor muscle, fascia and tendon of right thumb at wrist and hand level, initial encounter    Intramural leiomyoma of uterus    Lyme disease, unspecified    Tendinitis    Syncope and collapse       Objective:  /80   Pulse (!) 106   Ht 5' (1 524 m)   Wt 51 3 kg (113 lb)   BMI 22 07 kg/m²     Left Ankle Exam     Tenderness   Left ankle tenderness location: anterior joint/talus  Range of Motion   The patient has normal left ankle ROM  Muscle Strength   The patient has normal left ankle strength      Other   Erythema: absent    Comments:  Base 5th MT at insertion of peroneus tendon          Strength/Myotome Testing     Left Ankle/Foot   Normal strength      Physical Exam      Neurologic Exam    Procedures    I have personally reviewed pertinent films in PACS and my interpretation is Xrays Left ankle no acute fx or d/l, no significant degenerative changes

## 2023-01-23 ENCOUNTER — OFFICE VISIT (OUTPATIENT)
Dept: OBGYN CLINIC | Facility: CLINIC | Age: 38
End: 2023-01-23

## 2023-01-23 ENCOUNTER — APPOINTMENT (OUTPATIENT)
Dept: RADIOLOGY | Facility: CLINIC | Age: 38
End: 2023-01-23

## 2023-01-23 VITALS
SYSTOLIC BLOOD PRESSURE: 112 MMHG | OXYGEN SATURATION: 98 % | DIASTOLIC BLOOD PRESSURE: 70 MMHG | WEIGHT: 123 LBS | BODY MASS INDEX: 24.15 KG/M2 | HEIGHT: 60 IN | HEART RATE: 83 BPM

## 2023-01-23 DIAGNOSIS — M77.8 EXTENSOR CARPI ULNARIS TENDINITIS: ICD-10-CM

## 2023-01-23 DIAGNOSIS — R20.2 LEFT HAND PARESTHESIA: Primary | ICD-10-CM

## 2023-01-23 DIAGNOSIS — M25.532 PAIN IN LEFT WRIST: ICD-10-CM

## 2023-01-23 RX ORDER — MELOXICAM 15 MG/1
15 TABLET ORAL DAILY
Qty: 30 TABLET | Refills: 0 | Status: SHIPPED | OUTPATIENT
Start: 2023-01-23

## 2023-01-23 NOTE — PROGRESS NOTES
Subjective:  Adrian Kaye is a 44 y o  female complains of left elbow and wrist pain  Onset of the symptoms was several weeks ago  Mechanism of injury: none  Aggravating factors: overuse  Treatment to date: avoidance of offending activity and brace which is somewhat effective  Symptoms have progressed to a point and plateaued  Patient was diagnosed with carpal tunnel syndrome several years ago and was given a cock up wrist brace which she has been using for nighttime use  States that symptoms in the past had resolved with her wrist brace use however with most recent episode occurring this past few weeks she states that the nighttime brace use did not alleviate symptoms  Patient proceeded for initial evaluation in office today  Reports numbness and tingling in the fourth and fifth digits of the left hand which radiates up into the elbow    The following portions of the patient's history were reviewed and updated as appropriate: allergies, current medications, past family history, past medical history, past social history, past surgical history and problem list     Occupation:   [employer] ,[address] [employer]    Review of Systems   Constitutional: Negative for fever  Musculoskeletal: Positive for elbow pain  Neuro: Negative for numbness or tingling in arm       Objective:  /70   Pulse 83   Ht 5' (1 524 m)   Wt 55 8 kg (123 lb)   SpO2 98%   BMI 24 02 kg/m²     Skin: no rashes, lesions, skin discolorations, lacerations  Vasculature: normal radial and ulnar pulse, normal skin color, normal capillary refill in extremity, no upper extremity edema  Neurologic: Neurologic exam is normal throughout upper extremities, Awake, alert, and oriented x3, no apparent distress     Musculoskeletal:   Left Elbow Examination:                                                 Skin: normal                                            General Appearance: normal                                 Muscle Tone: normal Palpation-Tenderness:  positive medial epicondyle/ negative lateral epicondyle, negative flexor wad, negative radiocapitellar joint    Range of Motion  Flexion: full  Extension: full  Supination: full  Pronation: full    Pain with Resisted Motion:   Resisted wrist flexion: negative  Resisted wrist extension: negative   Resisted pronation/supination: positive  Resisted ulnar devluation: positive     positive Tinel's at the cubital tunnel  Negative Tinel's at the carpal tunnel  Negative Jasmyne's  5 out of 5 strength with finger abduction flexion and wrist flexion extension           Imaging:    See final report        Assessment/Plan:    1  Left hand paresthesia    - XR wrist 3+ vw left; Future  - XR forearm 2 vw left; Future  - Ambulatory Referral to Physical Therapy; Future  - meloxicam (Mobic) 15 mg tablet; Take 1 tablet (15 mg total) by mouth daily  Dispense: 30 tablet; Refill: 0  - EMG 1 Limb; Future    2  Extensor carpi ulnaris tendinitis    - Ambulatory Referral to Physical Therapy; Future  - meloxicam (Mobic) 15 mg tablet; Take 1 tablet (15 mg total) by mouth daily  Dispense: 30 tablet; Refill: 0      Clinical impression is that patient is suffering from cubital tunnel syndrome as evidenced by positive Tinel's at the cubital tunnel with radiation of numbness tingling into the fourth and fifth digits of the left hand  We discussed treatment options including a strapping at night to help flexion of the elbow  Additionally I am recommending patient follow-up with physical therapy and we will order an EMG for evaluation to confirm diagnosis  Radiographs of the forearm and wrist were reviewed a independently with patient and no fractures or dislocations were noted  Official read follow-up    Follow-up once EMG results return

## 2023-01-23 NOTE — LETTER
January 23, 2023     Patient: Rosey Chauhan  YOB: 1985  Date of Visit: 1/23/2023      To Whom it May Concern:    Ivette Woods is under my professional care  Ivette Hamilton was seen in my office on 1/23/2023  If you have any questions or concerns, please don't hesitate to call           Sincerely,          Gab Truong DO        CC: No Recipients

## 2023-02-24 ENCOUNTER — OFFICE VISIT (OUTPATIENT)
Dept: OBGYN CLINIC | Facility: CLINIC | Age: 38
End: 2023-02-24

## 2023-02-24 VITALS
SYSTOLIC BLOOD PRESSURE: 130 MMHG | HEART RATE: 66 BPM | WEIGHT: 119 LBS | HEIGHT: 60 IN | BODY MASS INDEX: 23.36 KG/M2 | DIASTOLIC BLOOD PRESSURE: 85 MMHG

## 2023-02-24 DIAGNOSIS — S46.001D ROTATOR CUFF INJURY, RIGHT, SUBSEQUENT ENCOUNTER: Primary | ICD-10-CM

## 2023-02-24 NOTE — PROGRESS NOTES
Assessment/Plan:  Assessment/Plan   Diagnoses and all orders for this visit:    Rotator cuff injury, right, subsequent encounter  -     MRI shoulder right wo contrast; Future      26-year-old right-hand-dominant female with right shoulder pain following injury 5/5/2022  Discussed with patient physical exam, imaging studies, impression, and plan  X-rays right shoulder are unremarkable for acute osseous abnormality  Physical exam right shoulder noted for tenderness at the anterior, lateral, and posterior aspects  She has range of motion forward flexion to 160 degrees, abduction 150 degrees, and internal rotation to the lumbar spine  She has normal strength in rotator cuff  There is pain with empty can test and positive Polanco  She is more than 9 months since injury and still symptomatic despite conservative management of cortical injection, methocarbamol 750 mg 3 times a day more than 6 weeks, naproxen 500 mg twice daily for more than 6 weeks  At this time I will refer her for MRI of the right shoulder to evaluate for rotator cuff tear and impingement as surgical intervention may be warranted  She will follow-up after getting MRI done  Subjective:   Patient ID: Kieran Han is a 44 y o  female  Chief Complaint   Patient presents with   • Right Shoulder - Follow-up       26-year-old right-hand-dominant female following up for right shoulder pain from injury 5/5/2022  She was last seen by me 8 months ago at which point she was referred for MRI the right shoulder  She states that due to [employer]  challenges has been unable to get MRI done  She denies improvement in symptoms since her last visit  She has pain described as generalized to the shoulder but worse to the lateral and superior aspects, nonradiating, throbbing and sometimes sharp, associated with clicking, worse with moving the arm and elevating, and improved with resting    She been managing symptoms with oral NSAIDs and applying topical diclofenac  Shoulder Pain  This is a new problem  The current episode started more than 1 month ago  The problem occurs daily  The problem has been unchanged  Associated symptoms include arthralgias  Pertinent negatives include no joint swelling, numbness or weakness  Exacerbated by: Arm movement  She has tried rest, position changes and NSAIDs (Corticosteroid injection, muscle relaxer) for the symptoms  The treatment provided mild relief  Review of Systems   Musculoskeletal: Positive for arthralgias  Negative for joint swelling  Neurological: Negative for weakness and numbness  Objective:  Vitals:    02/24/23 0922   BP: 130/85   Pulse: 66   Weight: 54 kg (119 lb)   Height: 5' (1 524 m)     Right Shoulder Exam     Tenderness   Right shoulder tenderness location: Anterior, lateral, posterior  Range of Motion   Active abduction: 150   Forward flexion: 160   Internal rotation 0 degrees: Lumbar     Muscle Strength   Abduction: 5/5   Internal rotation: 5/5   External rotation: 5/5   Supraspinatus: 5/5     Tests   Polanco test: positive    Comments:  Pain with empty can test            Physical Exam  Vitals and nursing note reviewed  Constitutional:       General: She is not in acute distress  Appearance: She is well-developed  She is not ill-appearing or diaphoretic  HENT:      Head: Normocephalic  Right Ear: External ear normal       Left Ear: External ear normal    Eyes:      Conjunctiva/sclera: Conjunctivae normal    Neck:      Trachea: No tracheal deviation  Cardiovascular:      Rate and Rhythm: Normal rate  Pulmonary:      Effort: Pulmonary effort is normal  No respiratory distress  Abdominal:      General: There is no distension  Musculoskeletal:         General: Tenderness present  No swelling, deformity or signs of injury  Skin:     General: Skin is warm and dry  Coloration: Skin is not jaundiced or pale     Neurological:      Mental Status: She is alert and oriented to person, place, and time  Psychiatric:         Mood and Affect: Mood normal          Behavior: Behavior normal          Thought Content:  Thought content normal          Judgment: Judgment normal

## 2023-03-22 ENCOUNTER — HOSPITAL ENCOUNTER (OUTPATIENT)
Dept: MRI IMAGING | Facility: CLINIC | Age: 38
Discharge: HOME | End: 2023-03-22

## 2023-03-22 DIAGNOSIS — S46.001D ROTATOR CUFF INJURY, RIGHT, SUBSEQUENT ENCOUNTER: ICD-10-CM

## 2023-04-07 ENCOUNTER — HOSPITAL ENCOUNTER (OUTPATIENT)
Dept: MRI IMAGING | Facility: CLINIC | Age: 38
Discharge: HOME | End: 2023-04-07

## 2023-05-06 ENCOUNTER — HOSPITAL ENCOUNTER (EMERGENCY)
Facility: HOSPITAL | Age: 38
Discharge: HOME | End: 2023-05-06
Attending: EMERGENCY MEDICINE

## 2023-05-06 ENCOUNTER — APPOINTMENT (EMERGENCY)
Dept: CT IMAGING | Facility: HOSPITAL | Age: 38
End: 2023-05-06

## 2023-05-06 VITALS
TEMPERATURE: 98.5 F | RESPIRATION RATE: 16 BRPM | SYSTOLIC BLOOD PRESSURE: 136 MMHG | DIASTOLIC BLOOD PRESSURE: 63 MMHG | OXYGEN SATURATION: 100 % | HEART RATE: 73 BPM

## 2023-05-06 DIAGNOSIS — D21.9 FIBROIDS: ICD-10-CM

## 2023-05-06 DIAGNOSIS — R10.9 ABDOMINAL PAIN: Primary | ICD-10-CM

## 2023-05-06 LAB
ALBUMIN SERPL BCP-MCNC: 4.6 G/DL (ref 3.5–5)
ALP SERPL-CCNC: 51 U/L (ref 34–104)
ALT SERPL W P-5'-P-CCNC: 15 U/L (ref 7–52)
ANION GAP SERPL CALCULATED.3IONS-SCNC: 3 MMOL/L (ref 4–13)
AST SERPL W P-5'-P-CCNC: 17 U/L (ref 13–39)
BASOPHILS # BLD AUTO: 0.05 THOUSANDS/ÂΜL (ref 0–0.1)
BASOPHILS NFR BLD AUTO: 1 % (ref 0–1)
BILIRUB SERPL-MCNC: 0.73 MG/DL (ref 0.2–1)
BILIRUB UR QL STRIP: NEGATIVE
BUN SERPL-MCNC: 13 MG/DL (ref 5–25)
CALCIUM SERPL-MCNC: 9.9 MG/DL (ref 8.4–10.2)
CHLORIDE SERPL-SCNC: 103 MMOL/L (ref 96–108)
CLARITY UR: CLEAR
CO2 SERPL-SCNC: 31 MMOL/L (ref 21–32)
COLOR UR: YELLOW
CREAT SERPL-MCNC: 0.75 MG/DL (ref 0.6–1.3)
EOSINOPHIL # BLD AUTO: 0.07 THOUSAND/ÂΜL (ref 0–0.61)
EOSINOPHIL NFR BLD AUTO: 1 % (ref 0–6)
ERYTHROCYTE [DISTWIDTH] IN BLOOD BY AUTOMATED COUNT: 12.7 % (ref 11.6–15.1)
GFR SERPL CREATININE-BSD FRML MDRD: 101 ML/MIN/1.73SQ M
GLUCOSE SERPL-MCNC: 70 MG/DL (ref 65–140)
GLUCOSE UR STRIP-MCNC: NEGATIVE MG/DL
HCG SERPL QL: NEGATIVE
HCT VFR BLD AUTO: 41.8 % (ref 34.8–46.1)
HGB BLD-MCNC: 13.6 G/DL (ref 11.5–15.4)
HGB UR QL STRIP.AUTO: NEGATIVE
IMM GRANULOCYTES # BLD AUTO: 0.01 THOUSAND/UL (ref 0–0.2)
IMM GRANULOCYTES NFR BLD AUTO: 0 % (ref 0–2)
KETONES UR STRIP-MCNC: NEGATIVE MG/DL
LEUKOCYTE ESTERASE UR QL STRIP: NEGATIVE
LIPASE SERPL-CCNC: 18 U/L (ref 11–82)
LYMPHOCYTES # BLD AUTO: 2.19 THOUSANDS/ÂΜL (ref 0.6–4.47)
LYMPHOCYTES NFR BLD AUTO: 45 % (ref 14–44)
MCH RBC QN AUTO: 30.2 PG (ref 26.8–34.3)
MCHC RBC AUTO-ENTMCNC: 32.5 G/DL (ref 31.4–37.4)
MCV RBC AUTO: 93 FL (ref 82–98)
MONOCYTES # BLD AUTO: 0.38 THOUSAND/ÂΜL (ref 0.17–1.22)
MONOCYTES NFR BLD AUTO: 8 % (ref 4–12)
NEUTROPHILS # BLD AUTO: 2.19 THOUSANDS/ÂΜL (ref 1.85–7.62)
NEUTS SEG NFR BLD AUTO: 45 % (ref 43–75)
NITRITE UR QL STRIP: NEGATIVE
NRBC BLD AUTO-RTO: 0 /100 WBCS
PH UR STRIP.AUTO: 5.5 [PH]
PLATELET # BLD AUTO: 260 THOUSANDS/UL (ref 149–390)
PMV BLD AUTO: 10 FL (ref 8.9–12.7)
POTASSIUM SERPL-SCNC: 3.8 MMOL/L (ref 3.5–5.3)
PROT SERPL-MCNC: 7.4 G/DL (ref 6.4–8.4)
PROT UR STRIP-MCNC: NEGATIVE MG/DL
RBC # BLD AUTO: 4.5 MILLION/UL (ref 3.81–5.12)
SODIUM SERPL-SCNC: 137 MMOL/L (ref 135–147)
SP GR UR STRIP.AUTO: 1.03 (ref 1–1.03)
UROBILINOGEN UR STRIP-ACNC: <2 MG/DL
WBC # BLD AUTO: 4.89 THOUSAND/UL (ref 4.31–10.16)

## 2023-05-06 RX ADMIN — IOHEXOL 100 ML: 350 INJECTION, SOLUTION INTRAVENOUS at 10:42

## 2023-05-06 NOTE — DISCHARGE INSTRUCTIONS
Return to ED if worsening pain, fevers, passing out or any other concerns   Otherwise please follow up with GYN for recheck of fibroids

## 2023-05-06 NOTE — ED PROVIDER NOTES
History  Chief Complaint   Patient presents with   • Abdominal Pain     Pt complains of abd pain that started yesterday, spotting, and missing a period last month  States that home pregnancy tests have been negative  (+)nausea      HPI  34-year-old female presents with abdominal pain that started 1 day ago, right lower quadrant, aching and constant  She states that she missed her period last month and has been spotting  Reports at home pregnancy test have been negative  She does get intermittent nausea  Denies dysuria, frequency, vaginal discharge  Prior to Admission Medications   Prescriptions Last Dose Informant Patient Reported? Taking? Aspirin-Acetaminophen-Caffeine (EXCEDRIN PO)   Yes No   Sig: Take 2 tablets by mouth as needed   Multiple Vitamin (MULTIVITAMIN ADULT PO)   Yes No   Sig: multivitamin   Patient not taking: Reported on 1/23/2023   cetirizine (ZyrTEC) 10 mg tablet   Yes No   Sig: Take 10 mg by mouth daily   meloxicam (Mobic) 15 mg tablet   No No   Sig: Take 1 tablet (15 mg total) by mouth daily   Patient not taking: Reported on 2/24/2023   methocarbamol (Robaxin-750) 750 mg tablet   No No   Sig: Take 1 tablet (750 mg total) by mouth 2 (two) times a day as needed for muscle spasms   Patient not taking: Reported on 1/23/2023      Facility-Administered Medications: None       Past Medical History:   Diagnosis Date   • Kidney stone        Past Surgical History:   Procedure Laterality Date   • EYE SURGERY      both eyes as a child   • LITHOTRIPSY         Family History   Problem Relation Age of Onset   • No Known Problems Mother    • No Known Problems Father      I have reviewed and agree with the history as documented      E-Cigarette/Vaping   • E-Cigarette Use Never User      E-Cigarette/Vaping Substances   • Nicotine No    • THC No    • CBD No    • Flavoring No    • Other No    • Unknown No      Social History     Tobacco Use   • Smoking status: Never   • Smokeless tobacco: Never   Vaping Use • Vaping Use: Never used   Substance Use Topics   • Alcohol use: Yes     Comment: occasional   • Drug use: Never       Review of Systems   Constitutional: Negative for chills and fever  HENT: Negative for dental problem and ear pain  Eyes: Negative for pain and redness  Respiratory: Negative for cough and shortness of breath  Cardiovascular: Negative for chest pain and palpitations  Gastrointestinal: Positive for abdominal pain and nausea  Endocrine: Negative for polydipsia and polyphagia  Genitourinary: Negative for dysuria and frequency  Musculoskeletal: Negative for arthralgias and joint swelling  Skin: Negative for color change and rash  Neurological: Negative for dizziness and headaches  Psychiatric/Behavioral: Negative for behavioral problems and confusion  All other systems reviewed and are negative  Physical Exam  Physical Exam  Vitals and nursing note reviewed  Constitutional:       General: She is not in acute distress  Appearance: She is well-developed  She is not diaphoretic  HENT:      Head: Atraumatic  Right Ear: External ear normal       Left Ear: External ear normal       Nose: Nose normal    Eyes:      Conjunctiva/sclera: Conjunctivae normal       Pupils: Pupils are equal, round, and reactive to light  Neck:      Vascular: No JVD  Cardiovascular:      Rate and Rhythm: Normal rate and regular rhythm  Heart sounds: Normal heart sounds  No murmur heard  Pulmonary:      Effort: Pulmonary effort is normal  No respiratory distress  Breath sounds: Normal breath sounds  No wheezing  Abdominal:      General: Bowel sounds are normal  There is no distension  Palpations: Abdomen is soft  Tenderness: There is abdominal tenderness in the right lower quadrant  Musculoskeletal:         General: Normal range of motion  Cervical back: Normal range of motion and neck supple  Skin:     General: Skin is warm and dry        Capillary Refill: Capillary refill takes less than 2 seconds  Neurological:      Mental Status: She is alert and oriented to person, place, and time  Cranial Nerves: No cranial nerve deficit     Psychiatric:         Behavior: Behavior normal          Vital Signs  ED Triage Vitals   Temperature Pulse Respirations Blood Pressure SpO2   05/06/23 0747 05/06/23 0747 05/06/23 0747 05/06/23 0747 05/06/23 0747   98 2 °F (36 8 °C) 76 18 132/79 99 %      Temp Source Heart Rate Source Patient Position - Orthostatic VS BP Location FiO2 (%)   05/06/23 0747 05/06/23 0747 05/06/23 0747 05/06/23 0747 --   Temporal Monitor Sitting Right arm       Pain Score       05/06/23 1027       No Pain           Vitals:    05/06/23 0747 05/06/23 1027   BP: 132/79 136/63   Pulse: 76 73   Patient Position - Orthostatic VS: Sitting Sitting         Visual Acuity      ED Medications  Medications   iohexol (OMNIPAQUE) 350 MG/ML injection (MULTI-DOSE) 100 mL (100 mL Intravenous Given 5/6/23 1042)       Diagnostic Studies  Results Reviewed     Procedure Component Value Units Date/Time    Pregnancy Test (HCG Qualitative) [[id number]]  (Normal) Collected: 05/06/23 0820    Lab Status: Final result Specimen: Blood from Arm, Right Updated: 05/06/23 1009     Preg, Serum Negative    UA w Reflex to Microscopic w Reflex to Culture [[id number]] Collected: 05/06/23 0856    Lab Status: Final result Specimen: Urine, Clean Catch Updated: 05/06/23 0929     Color, UA Yellow     Clarity, UA Clear     Specific Gravity, UA 1 029     pH, UA 5 5     Leukocytes, UA Negative     Nitrite, UA Negative     Protein, UA Negative mg/dl      Glucose, UA Negative mg/dl      Ketones, UA Negative mg/dl      Urobilinogen, UA <2 0 mg/dl      Bilirubin, UA Negative     Occult Blood, UA Negative    Comprehensive metabolic panel [[id number]]  (Abnormal) Collected: 05/06/23 0820    Lab Status: Final result Specimen: Blood from Arm, Right Updated: 05/06/23 0923     Sodium 137 mmol/L      Potassium 3 8 mmol/L      Chloride 103 mmol/L      CO2 31 mmol/L      ANION GAP 3 mmol/L      BUN 13 mg/dL      Creatinine 0 75 mg/dL      Glucose 70 mg/dL      Calcium 9 9 mg/dL      AST 17 U/L      ALT 15 U/L      Alkaline Phosphatase 51 U/L      Total Protein 7 4 g/dL      Albumin 4 6 g/dL      Total Bilirubin 0 73 mg/dL      eGFR 101 ml/min/1 73sq m     Narrative:      National Kidney Disease Foundation guidelines for Chronic Kidney Disease (CKD):   •  Stage 1 with normal or high GFR (GFR > 90 mL/min/1 73 square meters)  •  Stage 2 Mild CKD (GFR = 60-89 mL/min/1 73 square meters)  •  Stage 3A Moderate CKD (GFR = 45-59 mL/min/1 73 square meters)  •  Stage 3B Moderate CKD (GFR = 30-44 mL/min/1 73 square meters)  •  Stage 4 Severe CKD (GFR = 15-29 mL/min/1 73 square meters)  •  Stage 5 End Stage CKD (GFR <15 mL/min/1 73 square meters)  Note: GFR calculation is accurate only with a steady state creatinine    Lipase [[id number]]  (Normal) Collected: 05/06/23 0820    Lab Status: Final result Specimen: Blood from Arm, Right Updated: 05/06/23 0923     Lipase 18 u/L     CBC and differential [[id number]]  (Abnormal) Collected: 05/06/23 0820    Lab Status: Final result Specimen: Blood from Arm, Right Updated: 05/06/23 0843     WBC 4 89 Thousand/uL      RBC 4 50 Million/uL      Hemoglobin 13 6 g/dL      Hematocrit 41 8 %      MCV 93 fL      MCH 30 2 pg      MCHC 32 5 g/dL      RDW 12 7 %      MPV 10 0 fL      Platelets 221 Thousands/uL      nRBC 0 /100 WBCs      Neutrophils Relative 45 %      Immat GRANS % 0 %      Lymphocytes Relative 45 %      Monocytes Relative 8 %      Eosinophils Relative 1 %      Basophils Relative 1 %      Neutrophils Absolute 2 19 Thousands/µL      Immature Grans Absolute 0 01 Thousand/uL      Lymphocytes Absolute 2 19 Thousands/µL      Monocytes Absolute 0 38 Thousand/µL      Eosinophils Absolute 0 07 Thousand/µL      Basophils Absolute 0 05 Thousands/µL                  CT abdomen pelvis with contrast   Final Result by Italia Aldrich MD (05/06 1224)      Borderline enlarged appendix however there is no periappendiceal inflammatory stranding to suggest acute appendicitis   Further management on a clinical basis  Small amount of free fluid in the pelvis likely physiological   Enlarged uterus with multiple fibroids, can be assessed with nonemergent ultrasound      No intra-abdominal fluid collection seen         I personally discussed this study with Kiara Mantilla on 5/6/2023 12:24 PM                Workstation performed: [id number]                    Procedures  Procedures         ED Course                               SBIRT 22yo+    Flowsheet Row Most Recent Value   Initial Alcohol Screen: US AUDIT-C     1  How often do you have a drink containing alcohol? 0 Filed at: 05/06/2023 0749   2  How many drinks containing alcohol do you have on a typical day you are drinking? 0 Filed at: 05/06/2023 0749   3b  FEMALE Any Age, or MALE 65+: How often do you have 4 or more drinks on one occassion? 0 Filed at: 05/06/2023 0749   Audit-C Score 0 Filed at: 05/06/2023 1382   ROXANNE: How many times in the past year have you    Used an illegal drug or used a prescription medication for non-medical reasons? Never Filed at: 05/06/2023 5883                    [facility]  Patient presents with abdominal pain, no signs of acute appendicitis, she does have fibroids  Small amount of fluid may be secondary to ruptured cyst   Patient is comfortable, nontoxic, appears well    Discussed primary care and GYN follow-up and return for fevers, vomiting, worsening pain or syncope  Disposition  Final diagnoses:   Abdominal pain   Fibroids     Time reflects when diagnosis was documented in both MDM as applicable and the Disposition within this note     Time User Action Codes Description Comment    5/6/2023 12:27 PM Eliseo Trujillo [R10 9] Abdominal pain     5/6/2023 12:28 PM Andrew Dumont [D21 9] Fibroids       ED Disposition     ED Disposition Discharge    Condition   Stable    Date/Time   Sat May 6, 2023 12:27 PM    Comment   Kari Woods discharge to home/self care  Follow-up Information     Follow up With Specialties Details Why Dipak Ernandez MD Obstetrics and Gynecology, Obstetrics, Gynecology Call  for GYN follow up [address] [facility] [address] [facility]      Nitza Barney MD Internal Medicine Call   [address]  [address] [facility]  [phone]            Discharge Medication List as of 5/6/2023 12:28 PM      CONTINUE these medications which have NOT CHANGED    Details   Aspirin-Acetaminophen-Caffeine (EXCEDRIN PO) Take 2 tablets by mouth as needed, Historical Med      cetirizine (ZyrTEC) 10 mg tablet Take 10 mg by mouth daily, Historical Med      meloxicam (Mobic) 15 mg tablet Take 1 tablet (15 mg total) by mouth daily, Starting Mon 1/23/2023, Normal      methocarbamol (Robaxin-750) 750 mg tablet Take 1 tablet (750 mg total) by mouth 2 (two) times a day as needed for muscle spasms, Starting Wed 6/1/2022, Normal      Multiple Vitamin (MULTIVITAMIN ADULT PO) multivitamin, Historical Med             No discharge procedures on file      PDMP Review     None          ED Provider  Electronically Signed by           Sony Chi MD  05/06/23 4363

## 2023-09-13 ENCOUNTER — TELEPHONE (OUTPATIENT)
Dept: NEUROLOGY | Facility: CLINIC | Age: 38
End: 2023-09-13

## 2023-10-05 ENCOUNTER — TELEPHONE (OUTPATIENT)
Dept: NEUROLOGY | Facility: CLINIC | Age: 38
End: 2023-10-05

## 2023-10-09 ENCOUNTER — PROCEDURE VISIT (OUTPATIENT)
Dept: NEUROLOGY | Facility: CLINIC | Age: 38
End: 2023-10-09
Payer: COMMERCIAL

## 2023-10-09 DIAGNOSIS — R20.2 LEFT HAND PARESTHESIA: ICD-10-CM

## 2023-10-09 PROCEDURE — 95886 MUSC TEST DONE W/N TEST COMP: CPT | Performed by: PHYSICAL MEDICINE & REHABILITATION

## 2023-10-09 PROCEDURE — 95909 NRV CNDJ TST 5-6 STUDIES: CPT | Performed by: PHYSICAL MEDICINE & REHABILITATION

## 2023-10-09 NOTE — RESULT ENCOUNTER NOTE
Call to this patient no answer left a message to relay Dr Madeline Fall note regarding the EMG results " Please let patient know that EMG study results were normal.  No findings consistent for carpal or cubital tunnel syndrome.

## 2023-10-09 NOTE — PROGRESS NOTES
EMG 1 Limb     Date/Time 10/9/2023 10:30 AM     Performed by  Alina Dunbar MD   Authorized by Darcy Zee DO           EMG left upper extremity completed today.

## 2023-12-06 ENCOUNTER — APPOINTMENT (OUTPATIENT)
Dept: RADIOLOGY | Facility: CLINIC | Age: 38
End: 2023-12-06
Payer: COMMERCIAL

## 2023-12-06 ENCOUNTER — OFFICE VISIT (OUTPATIENT)
Dept: OBGYN CLINIC | Facility: CLINIC | Age: 38
End: 2023-12-06
Payer: OTHER MISCELLANEOUS

## 2023-12-06 VITALS — OXYGEN SATURATION: 98 % | RESPIRATION RATE: 18 BRPM | WEIGHT: 127 LBS | HEIGHT: 60 IN | BODY MASS INDEX: 24.94 KG/M2

## 2023-12-06 DIAGNOSIS — M79.671 PAIN IN RIGHT FOOT: ICD-10-CM

## 2023-12-06 DIAGNOSIS — M79.605 PAIN OF LEFT LOWER EXTREMITY: ICD-10-CM

## 2023-12-06 DIAGNOSIS — M79.605 PAIN OF LEFT LOWER EXTREMITY: Primary | ICD-10-CM

## 2023-12-06 DIAGNOSIS — S80.11XA CONTUSION OF RIGHT LOWER LEG, INITIAL ENCOUNTER: ICD-10-CM

## 2023-12-06 PROCEDURE — 99214 OFFICE O/P EST MOD 30 MIN: CPT | Performed by: ORTHOPAEDIC SURGERY

## 2023-12-06 PROCEDURE — 73590 X-RAY EXAM OF LOWER LEG: CPT

## 2023-12-06 PROCEDURE — 73630 X-RAY EXAM OF FOOT: CPT

## 2023-12-06 NOTE — PROGRESS NOTES
HPI:  Patient is a 44y.o. year old  female  who presents with chief complaint of right foot and leg pain after an injury at work on Monday 12/4/23 when her foot fell through concrete that caved in. She was treated at urgent care 12/4/23 who determined she had no acute fracture. She has been weight bearing. She has not been working. She works at the [employer]. ROS:   General: No fever, no chills, no weight loss, no weight gain  HEENT:  No loss of hearing, no nose bleeds, no sore throat  Eyes:  No eye pain, no red eyes, no visual disturbance  Respiratory:  No cough, no shortness of breath, no wheezing  Cardiovascular:  No chest pain, no palpitations, no edema  GI: No abdominal pain, no nausea, no vomiting  Endocrine: No frequent urination, no excessive thirst  Urinary:  No dysuria, no hematuria, no incontinence  Musculoskeletal: see HPI and PE  Skin:  No rash, no wounds  Neurological:  No dizziness, no headache, no numbness  Psychiatric:  No difficulty concentrating, no depression, no suicide thoughts, no anxiety  Review of all other systems is negative    PMH:  Past Medical History:   Diagnosis Date    Kidney stone        PSH:  Past Surgical History:   Procedure Laterality Date    EYE SURGERY      both eyes as a child    LITHOTRIPSY         Medications:  Current Outpatient Medications   Medication Sig Dispense Refill    Aspirin-Acetaminophen-Caffeine (EXCEDRIN PO) Take 2 tablets by mouth as needed      cetirizine (ZyrTEC) 10 mg tablet Take 10 mg by mouth daily      Multiple Vitamin (MULTIVITAMIN ADULT PO)        No current facility-administered medications for this visit. Allergies:   Allergies   Allergen Reactions    Doxycycline Nausea Only    Metronidazole Hives, Itching and Rash       Family History:  Family History   Problem Relation Age of Onset    No Known Problems Mother     No Known Problems Father        Social History:  Social History     Occupational History    Not on file   Tobacco Use Smoking status: Never    Smokeless tobacco: Never   Vaping Use    Vaping Use: Never used   Substance and Sexual Activity    Alcohol use: Yes     Comment: occasional    Drug use: Never    Sexual activity: Not on file       Physical Exam:  General :  Alert, cooperative, no distress, appears stated age  Resp. rate 18, height 5' (1.524 m), weight 57.6 kg (127 lb), SpO2 98 %. Head:  Normocephalic, without obvious abnormality, atraumatic   Eyes:  Conjunctiva/corneas clear, EOM's intact,   Ears: Both ears normal appearance, no hearing deficits. Nose: Nares normal, septum midline, no drainage    Neck: Supple,  trachea midline, no adenopathy, no tenderness, no mass   Back:   Symmetric, no curvature, ROM normal, no tenderness   Lungs:   Respirations unlabored   Chest Wall:  No tenderness or deformity   Extremities: Extremities normal, atraumatic, no cyanosis or edema      Pulses: 2+ and symmetric   Skin: Skin color, texture, turgor normal, no rashes or lesions      Neurologic: Normal           Ortho Exam  Right Ankle/foot  Active range of motion: normal  There is normal range of motion of toes in plantar flexion and dorsiflexion. There is no swelling and ecchymosis  There is tenderness present over the lateral foot    There is no pain with resisted motion. Anterior drawer test is negative. Talar tilt test is negative. Negative Soto's  Sensation is intact to light touch superficial peroneal, deep peroneal, tibial, saphenous, and sural nerve distributions. 2+ DP pulse present. Motor 5/5 ankle plantar and dorsiflexion and inversion and eversion  5/5 toe flexion and extension    Right Leg  Mild tenderness over peroneals and anterior tibialis  Small abrasion over mid anterior tibialis. Imaging Studies: The following imaging studies were reviewed in office today. My findings are noted.   X-rays of the right foot obtained 12/6/23 demonstrates no acute osseous abnormality  X-rays of the right tibia fibula obtained 12/6/23 demonstrates no acute osseous abnormality    Assessment  Encounter Diagnoses   Name Primary?     Pain of left lower extremity Yes    Pain in right foot     Contusion of right lower leg, initial encounter          Plan:  44 y.o female with right lower leg and foot contusion  X-rays reviewed in office today  A note was provided allowing her to return to work on 12/11/23  She may do range of motion exercises at home  Ice, elevation, OTC analgesics as needed for pain management  She will follow up as needed    Scribe Attestation      I,:  Kamala Ibrahim am acting as a scribe while in the presence of the attending physician.:       I,:  Lady Analilia MD personally performed the services described in this documentation    as scribed in my presence.:

## 2023-12-06 NOTE — LETTER
December 6, 2023     Patient: Placido Hernandez  YOB: 1985  Date of Visit: 12/6/2023      To Whom it May Concern:    Brijesh Medeiros Chuck is under my professional care. Brijesh Medeiros was seen in my office on 12/6/2023. Brijesh Medeiros is to remain out of work until 12/11/23. She may return on 12/11/23 with full duty. If you have any questions or concerns, please don't hesitate to call.          Sincerely,          Balaji Flores MD        CC: No Recipients

## 2024-04-16 ENCOUNTER — HOSPITAL ENCOUNTER (EMERGENCY)
Facility: HOSPITAL | Age: 39
Discharge: HOME | End: 2024-04-17
Attending: EMERGENCY MEDICINE
Payer: COMMERCIAL

## 2024-04-16 ENCOUNTER — APPOINTMENT (EMERGENCY)
Dept: RADIOLOGY | Facility: HOSPITAL | Age: 39
End: 2024-04-16
Payer: COMMERCIAL

## 2024-04-16 ENCOUNTER — APPOINTMENT (EMERGENCY)
Dept: CT IMAGING | Facility: HOSPITAL | Age: 39
End: 2024-04-16
Payer: COMMERCIAL

## 2024-04-16 VITALS
RESPIRATION RATE: 17 BRPM | SYSTOLIC BLOOD PRESSURE: 126 MMHG | TEMPERATURE: 98.1 F | OXYGEN SATURATION: 99 % | HEART RATE: 84 BPM | DIASTOLIC BLOOD PRESSURE: 80 MMHG

## 2024-04-16 DIAGNOSIS — R10.9 ABDOMINAL PAIN: ICD-10-CM

## 2024-04-16 DIAGNOSIS — R07.9 CHEST PAIN: Primary | ICD-10-CM

## 2024-04-16 DIAGNOSIS — K59.00 CONSTIPATION: ICD-10-CM

## 2024-04-16 DIAGNOSIS — D25.9 FIBROID UTERUS: ICD-10-CM

## 2024-04-16 LAB
ALBUMIN SERPL BCP-MCNC: 4.2 G/DL (ref 3.5–5)
ALP SERPL-CCNC: 67 U/L (ref 34–104)
ALT SERPL W P-5'-P-CCNC: 13 U/L (ref 7–52)
ANION GAP SERPL CALCULATED.3IONS-SCNC: 4 MMOL/L (ref 4–13)
APTT PPP: 31 SECONDS (ref 23–37)
AST SERPL W P-5'-P-CCNC: 23 U/L (ref 13–39)
BASOPHILS # BLD MANUAL: 0.18 THOUSAND/UL (ref 0–0.1)
BASOPHILS NFR MAR MANUAL: 3 % (ref 0–1)
BILIRUB SERPL-MCNC: 0.32 MG/DL (ref 0.2–1)
BUN SERPL-MCNC: 14 MG/DL (ref 5–25)
CALCIUM SERPL-MCNC: 9.2 MG/DL (ref 8.4–10.2)
CARDIAC TROPONIN I PNL SERPL HS: <2 NG/L
CHLORIDE SERPL-SCNC: 106 MMOL/L (ref 96–108)
CO2 SERPL-SCNC: 27 MMOL/L (ref 21–32)
CREAT SERPL-MCNC: 0.91 MG/DL (ref 0.6–1.3)
EOSINOPHIL # BLD MANUAL: 0 THOUSAND/UL (ref 0–0.4)
EOSINOPHIL NFR BLD MANUAL: 0 % (ref 0–6)
ERYTHROCYTE [DISTWIDTH] IN BLOOD BY AUTOMATED COUNT: 12.5 % (ref 11.6–15.1)
EXT PREGNANCY TEST URINE: NEGATIVE
EXT. CONTROL: NORMAL
GFR SERPL CREATININE-BSD FRML MDRD: 79 ML/MIN/1.73SQ M
GLUCOSE SERPL-MCNC: 65 MG/DL (ref 65–140)
HCT VFR BLD AUTO: 36.7 % (ref 34.8–46.1)
HGB BLD-MCNC: 12.1 G/DL (ref 11.5–15.4)
INR PPP: 0.98 (ref 0.84–1.19)
LYMPHOCYTES # BLD AUTO: 3.56 THOUSAND/UL (ref 0.6–4.47)
LYMPHOCYTES # BLD AUTO: 58 % (ref 14–44)
MCH RBC QN AUTO: 30.3 PG (ref 26.8–34.3)
MCHC RBC AUTO-ENTMCNC: 33 G/DL (ref 31.4–37.4)
MCV RBC AUTO: 92 FL (ref 82–98)
MONOCYTES # BLD AUTO: 0.31 THOUSAND/UL (ref 0–1.22)
MONOCYTES NFR BLD: 5 % (ref 4–12)
NEUTROPHILS # BLD MANUAL: 2.08 THOUSAND/UL (ref 1.85–7.62)
NEUTS SEG NFR BLD AUTO: 34 % (ref 43–75)
PLATELET # BLD AUTO: 258 THOUSANDS/UL (ref 149–390)
PLATELET BLD QL SMEAR: ADEQUATE
PMV BLD AUTO: 10.2 FL (ref 8.9–12.7)
POTASSIUM SERPL-SCNC: 4.4 MMOL/L (ref 3.5–5.3)
PROT SERPL-MCNC: 7.4 G/DL (ref 6.4–8.4)
PROTHROMBIN TIME: 13.6 SECONDS (ref 11.6–14.5)
RBC # BLD AUTO: 4 MILLION/UL (ref 3.81–5.12)
RBC MORPH BLD: NORMAL
SODIUM SERPL-SCNC: 137 MMOL/L (ref 135–147)
WBC # BLD AUTO: 6.13 THOUSAND/UL (ref 4.31–10.16)

## 2024-04-16 PROCEDURE — 93005 ELECTROCARDIOGRAM TRACING: CPT

## 2024-04-16 PROCEDURE — 36415 COLL VENOUS BLD VENIPUNCTURE: CPT | Performed by: PHYSICIAN ASSISTANT

## 2024-04-16 PROCEDURE — 99285 EMERGENCY DEPT VISIT HI MDM: CPT

## 2024-04-16 PROCEDURE — 81025 URINE PREGNANCY TEST: CPT | Performed by: PHYSICIAN ASSISTANT

## 2024-04-16 PROCEDURE — 85027 COMPLETE CBC AUTOMATED: CPT | Performed by: PHYSICIAN ASSISTANT

## 2024-04-16 PROCEDURE — 99285 EMERGENCY DEPT VISIT HI MDM: CPT | Performed by: PHYSICIAN ASSISTANT

## 2024-04-16 PROCEDURE — 84484 ASSAY OF TROPONIN QUANT: CPT | Performed by: PHYSICIAN ASSISTANT

## 2024-04-16 PROCEDURE — 74177 CT ABD & PELVIS W/CONTRAST: CPT

## 2024-04-16 PROCEDURE — 96374 THER/PROPH/DIAG INJ IV PUSH: CPT

## 2024-04-16 PROCEDURE — 85610 PROTHROMBIN TIME: CPT | Performed by: PHYSICIAN ASSISTANT

## 2024-04-16 PROCEDURE — 80053 COMPREHEN METABOLIC PANEL: CPT | Performed by: PHYSICIAN ASSISTANT

## 2024-04-16 PROCEDURE — 71046 X-RAY EXAM CHEST 2 VIEWS: CPT

## 2024-04-16 PROCEDURE — 85007 BL SMEAR W/DIFF WBC COUNT: CPT | Performed by: PHYSICIAN ASSISTANT

## 2024-04-16 PROCEDURE — 85730 THROMBOPLASTIN TIME PARTIAL: CPT | Performed by: PHYSICIAN ASSISTANT

## 2024-04-16 RX ORDER — KETOROLAC TROMETHAMINE 30 MG/ML
15 INJECTION, SOLUTION INTRAMUSCULAR; INTRAVENOUS ONCE
Status: COMPLETED | OUTPATIENT
Start: 2024-04-16 | End: 2024-04-16

## 2024-04-16 RX ADMIN — IOHEXOL 100 ML: 350 INJECTION, SOLUTION INTRAVENOUS at 23:55

## 2024-04-16 RX ADMIN — KETOROLAC TROMETHAMINE 15 MG: 30 INJECTION, SOLUTION INTRAMUSCULAR; INTRAVENOUS at 23:34

## 2024-04-17 LAB — CARDIAC TROPONIN I PNL SERPL HS: <2 NG/L

## 2024-04-17 PROCEDURE — 84484 ASSAY OF TROPONIN QUANT: CPT | Performed by: PHYSICIAN ASSISTANT

## 2024-04-17 PROCEDURE — 36415 COLL VENOUS BLD VENIPUNCTURE: CPT | Performed by: PHYSICIAN ASSISTANT

## 2024-04-17 RX ORDER — POLYETHYLENE GLYCOL 3350 17 G/17G
17 POWDER, FOR SOLUTION ORAL DAILY
Qty: 10 EACH | Refills: 0 | Status: SHIPPED | OUTPATIENT
Start: 2024-04-17

## 2024-04-17 RX ORDER — POLYETHYLENE GLYCOL 3350 17 G/17G
17 POWDER, FOR SOLUTION ORAL ONCE
Status: DISCONTINUED | OUTPATIENT
Start: 2024-04-17 | End: 2024-04-17 | Stop reason: HOSPADM

## 2024-04-17 NOTE — ED PROVIDER NOTES
"History  Chief Complaint   Patient presents with    Chest Pain     Pt reports chest pain described as a \"sharp\" pain x 1 week. Denies radiating pains. Pt denies any cardiac hx. Pt also reports lower abdominal pain. Pt denies n/v.      This is a pleasant and well-appearing 38-year-old female arriving ambulatory to the emergency department for evaluation with complaint of nonspecific central chest pain as well as right-sided abdominal pain primarily in the right lower quadrant x 1 week.  Patient reports that she has no distinct pleuritic chest pain though she did find her chest discomfort was found to be provoked by bending over and position change.  She denies any shortness of breath or exertional dyspnea.  Denies congestion, cough, fevers, chills, sweats, recent illness or sick contact.  Denies any injury or trauma.  Denies recent travel, recent surgical procedures, calf pain or leg swelling.  Denies any nausea, vomiting, diarrhea, constipation. She has no personal history of hypertension, hyperlipidemia, diabetes, tobacco use or smoking history.  She has no personal or family history of VTE, and no significant family cardiac history.  Patient offers no other complaints or concerns at this time.        Prior to Admission Medications   Prescriptions Last Dose Informant Patient Reported? Taking?   Aspirin-Acetaminophen-Caffeine (EXCEDRIN PO)  Self Yes No   Sig: Take 2 tablets by mouth as needed   Multiple Vitamin (MULTIVITAMIN ADULT PO)  Self Yes No   cetirizine (ZyrTEC) 10 mg tablet  Self Yes No   Sig: Take 10 mg by mouth daily      Facility-Administered Medications: None       Past Medical History:   Diagnosis Date    Kidney stone        Past Surgical History:   Procedure Laterality Date    EYE SURGERY      both eyes as a child    LITHOTRIPSY         Family History   Problem Relation Age of Onset    No Known Problems Mother     No Known Problems Father      I have reviewed and agree with the history as " documented.    E-Cigarette/Vaping    E-Cigarette Use Never User      E-Cigarette/Vaping Substances    Nicotine No     THC No     CBD No     Flavoring No     Other No     Unknown No      Social History     Tobacco Use    Smoking status: Never    Smokeless tobacco: Never   Vaping Use    Vaping status: Never Used   Substance Use Topics    Alcohol use: Yes     Comment: occasional    Drug use: Never       Review of Systems   Constitutional:  Negative for chills, diaphoresis and fever.   Cardiovascular:  Positive for chest pain.   Gastrointestinal:  Positive for abdominal pain. Negative for nausea and vomiting.   Skin:  Negative for rash.   All other systems reviewed and are negative.      Physical Exam  Physical Exam  Vitals and nursing note reviewed.   Constitutional:       General: She is not in acute distress.     Appearance: Normal appearance. She is well-developed. She is not ill-appearing, toxic-appearing or diaphoretic.   HENT:      Head: Normocephalic and atraumatic.      Right Ear: External ear normal.      Left Ear: External ear normal.   Eyes:      Conjunctiva/sclera: Conjunctivae normal.   Cardiovascular:      Rate and Rhythm: Normal rate and regular rhythm.      Pulses: Normal pulses.   Pulmonary:      Effort: Pulmonary effort is normal. No respiratory distress.      Breath sounds: Normal breath sounds. No wheezing, rhonchi or rales.   Chest:      Chest wall: No tenderness.   Abdominal:      General: There is no distension.      Palpations: Abdomen is soft.      Tenderness: There is abdominal tenderness (RLQ). There is no guarding or rebound.   Musculoskeletal:         General: Normal range of motion.      Cervical back: Normal range of motion and neck supple.      Right lower leg: No tenderness. No edema.      Left lower leg: No tenderness. No edema.   Skin:     General: Skin is warm and dry.      Capillary Refill: Capillary refill takes less than 2 seconds.   Neurological:      Mental Status: She is alert.       Motor: Motor function is intact. No weakness.      Gait: Gait is intact.   Psychiatric:         Mood and Affect: Mood normal.         Vital Signs  ED Triage Vitals [04/16/24 2121]   Temperature Pulse Respirations Blood Pressure SpO2   98.1 °F (36.7 °C) 84 17 126/80 99 %      Temp Source Heart Rate Source Patient Position - Orthostatic VS BP Location FiO2 (%)   Oral Monitor Sitting Left arm --      Pain Score       No Pain           Vitals:    04/16/24 2121   BP: 126/80   Pulse: 84   Patient Position - Orthostatic VS: Sitting         Visual Acuity      ED Medications  Medications - No data to display    Diagnostic Studies  Results Reviewed       Procedure Component Value Units Date/Time    HS Troponin I 2hr [[id number]] Collected: 04/17/24 0044    Lab Status: Final result Specimen: Blood from Arm, Right Updated: 04/17/24 0112     hs TnI 2hr <2 ng/L      Delta 2hr hsTnI --    POCT pregnancy, urine [[id number]]  (Normal) Resulted: 04/16/24 2333    Lab Status: Final result Updated: 04/16/24 2333     EXT Preg Test, Ur Negative     Control Valid    RBC Morphology Reflex Test [[id number]] Collected: 04/16/24 2202    Lab Status: Final result Specimen: Blood from Arm, Right Updated: 04/16/24 2301    CBC and differential [[id number]]  (Normal) Collected: 04/16/24 2202    Lab Status: Final result Specimen: Blood from Arm, Right Updated: 04/16/24 2243     WBC 6.13 Thousand/uL      RBC 4.00 Million/uL      Hemoglobin 12.1 g/dL      Hematocrit 36.7 %      MCV 92 fL      MCH 30.3 pg      MCHC 33.0 g/dL      RDW 12.5 %      MPV 10.2 fL      Platelets 258 Thousands/uL     Narrative:      This is an appended report.  These results have been appended to a previously verified report.    Manual Differential(PHLEBS Do Not Order) [[id number]]  (Abnormal) Collected: 04/16/24 2202    Lab Status: Final result Specimen: Blood from Arm, Right Updated: 04/16/24 2243     Segmented % 34 %      Lymphocytes % 58 %      Monocytes % 5 %       Eosinophils % 0 %      Basophils % 3 %      Absolute Neutrophils 2.08 Thousand/uL      Absolute Lymphocytes 3.56 Thousand/uL      Absolute Monocytes 0.31 Thousand/uL      Absolute Eosinophils 0.00 Thousand/uL      Absolute Basophils 0.18 Thousand/uL      Total Counted --     RBC Morphology Normal     Platelet Estimate Adequate    Protime-INR [[id number]]  (Normal) Collected: 04/16/24 2202    Lab Status: Final result Specimen: Blood from Arm, Right Updated: 04/16/24 2234     Protime 13.6 seconds      INR 0.98    APTT [[id number]]  (Normal) Collected: 04/16/24 2202    Lab Status: Final result Specimen: Blood from Arm, Right Updated: 04/16/24 2234     PTT 31 seconds     HS Troponin 0hr (reflex protocol) [[id number]]  (Normal) Collected: 04/16/24 2202    Lab Status: Final result Specimen: Blood from Arm, Right Updated: 04/16/24 2232     hs TnI 0hr <2 ng/L     Comprehensive metabolic panel [[id number]] Collected: 04/16/24 2202    Lab Status: Final result Specimen: Blood from Arm, Right Updated: 04/16/24 2224     Sodium 137 mmol/L      Potassium 4.4 mmol/L      Chloride 106 mmol/L      CO2 27 mmol/L      ANION GAP 4 mmol/L      BUN 14 mg/dL      Creatinine 0.91 mg/dL      Glucose 65 mg/dL      Calcium 9.2 mg/dL      AST 23 U/L      ALT 13 U/L      Alkaline Phosphatase 67 U/L      Total Protein 7.4 g/dL      Albumin 4.2 g/dL      Total Bilirubin 0.32 mg/dL      eGFR 79 ml/min/1.73sq m     Narrative:      [employer] guidelines for Chronic Kidney Disease (CKD):     Stage 1 with normal or high GFR (GFR > 90 mL/min/1.73 square meters)    Stage 2 Mild CKD (GFR = 60-89 mL/min/1.73 square meters)    Stage 3A Moderate CKD (GFR = 45-59 mL/min/1.73 square meters)    Stage 3B Moderate CKD (GFR = 30-44 mL/min/1.73 square meters)    Stage 4 Severe CKD (GFR = 15-29 mL/min/1.73 square meters)    Stage 5 End Stage CKD (GFR <15 mL/min/1.73 square meters)  Note: GFR calculation is accurate only with a steady state  creatinine                   XR chest pa & lateral    (Results Pending)              Procedures  ECG 12 Lead Documentation Only    Date/Time: 4/16/2024 9:25 PM    Performed by: Jennifer Frederick PA-C  Authorized by: Jennifer Frederick PA-C    Indications / Diagnosis:  Cp  ECG reviewed by me, the ED Provider: yes    Patient location:  ED  Rate:     ECG rate:  68    ECG rate assessment: normal    Rhythm:     Rhythm: sinus rhythm    Ectopy:     Ectopy: none    QRS:     QRS axis:  Normal    QRS intervals:  Normal  Comments:      No ischemic ST/T wave changes           ED Course                               SBIRT 22yo+      Flowsheet Row Most Recent Value   Initial Alcohol Screen: US AUDIT-C     1. How often do you have a drink containing alcohol? 0 Filed at: 04/16/2024 2205   2. How many drinks containing alcohol do you have on a typical day you are drinking?  0 Filed at: 04/16/2024 2205   3b. FEMALE Any Age, or MALE 65+: How often do you have 4 or more drinks on one occassion? 0 Filed at: 04/16/2024 2205   Audit-C Score 0 Filed at: 04/16/2024 2205   ROXANNE: How many times in the past year have you...    Used an illegal drug or used a prescription medication for non-medical reasons? Never Filed at: 04/16/2024 2205                      Medical Decision Making  MDM: This is a pleasant and well-appearing 38-year-old female arriving ambulatory to the emergency department for evaluation with complaint of central chest discomfort and right-sided abdominal pain.  Reports symptoms have been ongoing for approximately 1 week with no inciting event to her knowledge.  She has no cardiac or PE risk factors.  Vital signs are stable on presentation with examination as above.  The patient has right lower quadrant tenderness upon palpation.  Patient advised of plan to place IV, obtain ECG, lab work and imaging which she is comfortable and agreeable with. Ddx includes but is not limited to costochondritis, acs/anginal equivalent,  gastritis, gerd, appendicitis, constipation, enteritis, colitis, diverticulitis.  ECG without ischemic change, troponin within normal limits.  Chest x-ray demonstrates no acute cardiopulmonary process on my preliminary review.  Patient advised of results so far and reassuring workup.  Care signed out to Singh Singh PA-C, to follow-up results of CT abdomen/pelvis and disposition patient accordingly.  If there are no acute abnormalities, anticipate discharge with supportive care and outpatient follow-up.  Patient in stable condition and resting comfortably at time of signout.    Amount and/or Complexity of Data Reviewed  Labs: ordered.  Radiology: ordered and independent interpretation performed.  ECG/medicine tests: ordered.    Risk  Prescription drug management.             Disposition  Final diagnoses:   None     ED Disposition       None          Follow-up Information    None         Patient's Medications   Discharge Prescriptions    No medications on file       No discharge procedures on file.    PDMP Review       None            ED Provider  Electronically Signed by             Jennifer Frederick PA-C  04/22/24 [id number]

## 2024-04-17 NOTE — DISCHARGE INSTRUCTIONS
Today I provided prescription for MiraLAX.  Take as directed for treatment of constipation.  Make sure to obtain adequate oral hydration, fiber to help with stool passage.  Follow-up with your OB/GYN in regards to uterine fibroid seen on CT today.  Follow-up with primary care provider as needed.  Return to ED for new or worsening symptoms.

## 2024-04-18 LAB
ATRIAL RATE: 68 BPM
P AXIS: 78 DEGREES
PR INTERVAL: 162 MS
QRS AXIS: 87 DEGREES
QRSD INTERVAL: 70 MS
QT INTERVAL: 356 MS
QTC INTERVAL: 378 MS
T WAVE AXIS: 72 DEGREES
VENTRICULAR RATE: 68 BPM

## 2024-04-18 PROCEDURE — 93010 ELECTROCARDIOGRAM REPORT: CPT | Performed by: INTERNAL MEDICINE
